# Patient Record
Sex: MALE | Race: WHITE
[De-identification: names, ages, dates, MRNs, and addresses within clinical notes are randomized per-mention and may not be internally consistent; named-entity substitution may affect disease eponyms.]

---

## 2020-09-02 ENCOUNTER — HOSPITAL ENCOUNTER (INPATIENT)
Dept: HOSPITAL 74 - JER | Age: 65
LOS: 1 days | Discharge: HOME | DRG: 313 | End: 2020-09-03
Attending: INTERNAL MEDICINE | Admitting: HOSPITALIST
Payer: COMMERCIAL

## 2020-09-02 VITALS — BODY MASS INDEX: 32.5 KG/M2

## 2020-09-02 DIAGNOSIS — G43.909: ICD-10-CM

## 2020-09-02 DIAGNOSIS — E78.5: ICD-10-CM

## 2020-09-02 DIAGNOSIS — R91.1: ICD-10-CM

## 2020-09-02 DIAGNOSIS — I44.7: ICD-10-CM

## 2020-09-02 DIAGNOSIS — E66.9: ICD-10-CM

## 2020-09-02 DIAGNOSIS — N17.9: ICD-10-CM

## 2020-09-02 DIAGNOSIS — I44.0: ICD-10-CM

## 2020-09-02 DIAGNOSIS — R07.89: Primary | ICD-10-CM

## 2020-09-02 LAB
ALBUMIN SERPL-MCNC: 3.8 G/DL (ref 3.4–5)
ALP SERPL-CCNC: 92 U/L (ref 45–117)
ALT SERPL-CCNC: 34 U/L (ref 13–61)
ANION GAP SERPL CALC-SCNC: 6 MMOL/L (ref 8–16)
APTT BLD: 26.3 SECONDS (ref 25.2–36.5)
AST SERPL-CCNC: 30 U/L (ref 15–37)
BASOPHILS # BLD: 0.6 % (ref 0–2)
BILIRUB SERPL-MCNC: 0.9 MG/DL (ref 0.2–1)
BNP SERPL-MCNC: 78.3 PG/ML (ref 5–125)
BUN SERPL-MCNC: 22.8 MG/DL (ref 7–18)
CALCIUM SERPL-MCNC: 9.6 MG/DL (ref 8.5–10.1)
CHLORIDE SERPL-SCNC: 102 MMOL/L (ref 98–107)
CO2 SERPL-SCNC: 30 MMOL/L (ref 21–32)
CREAT SERPL-MCNC: 1.6 MG/DL (ref 0.55–1.3)
DEPRECATED RDW RBC AUTO: 13.9 % (ref 11.9–15.9)
EOSINOPHIL # BLD: 8.6 % (ref 0–4.5)
GLUCOSE SERPL-MCNC: 110 MG/DL (ref 74–106)
HCT VFR BLD CALC: 46.1 % (ref 35.4–49)
HGB BLD-MCNC: 15.6 GM/DL (ref 11.7–16.9)
INR BLD: 0.95 (ref 0.83–1.09)
LYMPHOCYTES # BLD: 27.1 % (ref 8–40)
MAGNESIUM SERPL-MCNC: 2.3 MG/DL (ref 1.8–2.4)
MCH RBC QN AUTO: 30.4 PG (ref 25.7–33.7)
MCHC RBC AUTO-ENTMCNC: 33.8 G/DL (ref 32–35.9)
MCV RBC: 90 FL (ref 80–96)
MONOCYTES # BLD AUTO: 7.9 % (ref 3.8–10.2)
NEUTROPHILS # BLD: 55.8 % (ref 42.8–82.8)
PLATELET # BLD AUTO: 185 K/MM3 (ref 134–434)
PMV BLD: 8.2 FL (ref 7.5–11.1)
POTASSIUM SERPLBLD-SCNC: 4.8 MMOL/L (ref 3.5–5.1)
PROT SERPL-MCNC: 7.4 G/DL (ref 6.4–8.2)
PT PNL PPP: 11.2 SEC (ref 9.7–13)
RBC # BLD AUTO: 5.12 M/MM3 (ref 4–5.6)
SODIUM SERPL-SCNC: 138 MMOL/L (ref 136–145)
WBC # BLD AUTO: 10.4 K/MM3 (ref 4–10)

## 2020-09-02 PROCEDURE — U0003 INFECTIOUS AGENT DETECTION BY NUCLEIC ACID (DNA OR RNA); SEVERE ACUTE RESPIRATORY SYNDROME CORONAVIRUS 2 (SARS-COV-2) (CORONAVIRUS DISEASE [COVID-19]), AMPLIFIED PROBE TECHNIQUE, MAKING USE OF HIGH THROUGHPUT TECHNOLOGIES AS DESCRIBED BY CMS-2020-01-R: HCPCS

## 2020-09-02 PROCEDURE — A9502 TC99M TETROFOSMIN: HCPCS

## 2020-09-02 NOTE — PDOC
History of Present Illness





- General


Chief Complaint: Chest Pain


Stated Complaint: CHEST TIGHTNESS


Time Seen by Provider: 09/02/20 21:01





- History of Present Illness


Initial Comments: 





09/02/20 21:44


65 M with HLD, obesity presented to the ED for chest pain. Patient had sudden 

non-exertional chest pain this afternoon shortly after eating dinner. Chest pain

located across the chest, felt like belt tying across the chest, pain is 4/10, 

sometimes radiated to the jaws bilaterally. Denies diaphoresis, 

N/V/fever/abdominal pain, SOB, unilat leg swelling. Patient never had chest 

pain. Patient had a brother who had a heart attack at the age of 59, but did 

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 

heart attack. Patient never had kidney stone, gallstone, or shingles. 





PMH: HLD, migraine. 


PSH: tonsil removal, kidney donator. 


Med: propanolol, nortriptant ., lorvastatin 


Allergy: none 


SS: denies smoking, drinking, drugs. 


PCP: rosette 





ROS


GENERAL/CONSTITUTIONAL: No fever or chills. No weakness.


HEAD, EYES, EARS, NOSE AND THROAT: No change in vision. No ear pain or 

discharge. No sore throat.


CARDIOVASCULAR: No chest pain or shortness of breath


RESPIRATORY: No cough, wheezing, or hemoptysis.


GASTROINTESTINAL: No nausea, vomiting, diarrhea or constipation.


GENITOURINARY: No dysuria, frequency, or change in urination.


MUSCULOSKELETAL: No joint or muscle swelling or pain. No neck or back pain.


SKIN: No rash


NEUROLOGIC: No headache, vertigo, loss of consciousness, or change in 

strength/sensation.


ENDOCRINE: No increased thirst. No abnormal weight change


HEMATOLOGIC/LYMPHATIC: No anemia, easy bleeding, or history of blood clots.


ALLERGIC/IMMUNOLOGIC: No hives or skin allergy.





PE


GENERAL: Awake, alert, and fully oriented, in no acute distress


HEAD: No signs of trauma, normocephalic, atraumatic 


EYES: PERRLA, EOMI, sclera anicteric, conjunctiva clear


ENT: Auricles normal inspection, hearing grossly normal, nares patent, 

oropharynx clear without


exudates. Moist mucosa


NECK: Normal ROM, supple, no lymphadenopathy, JVD, or masses


LUNGS: No distress, speaks full sentences, clear to auscultation bilaterally    


HEART: Regular rate and rhythm, normal S1 and S2, no murmurs, rubs or gallops, 

peripheral pulses normal and equal bilaterally. 


ABDOMEN: Soft, nontender, normoactive bowel sounds.  No guarding, no rebound.  

No masses


EXTREMITIES : Normal inspection, Normal range of motion, no edema.  No clubbing 

or cyanosis. 


NEUROLOGICAL: Cranial nerves II through XII grossly intact.  Normal speech, 

normal gait, no focal sensorimotor deficits 


SKIN: Warm, Dry, normal turgor, no rashes or lesions noted











Past History





- Medical History


Allergies/Adverse Reactions: 


                                    Allergies











Allergy/AdvReac Type Severity Reaction Status Date / Time


 


No Known Allergies Allergy   Verified 08/14/12 16:52











Anemia: No


Asthma: No


Cancer: No


Cardiac Disorders: No


CVA: No


COPD: No


CHF: No


Dementia: No


Diabetes: No


GI Disorders: No


 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


HTN: No


Hypercholesterolemia: Yes


Seizures: No


Thyroid Disease: No





- Surgical History


Cardiac Surgery: No


Lung Surgery: No


Neurologic Surgery: No


Orthopedic Surgery: Yes (BACK SURGERY)





- Psycho-Social/Smoking History


Smoking Status: No


Smoking History: Never smoked


Number of Cigarettes Smoked Daily: 0





- Substance Abuse Hx (Audit-C & DAST Scrn)


How often the patient has a drink containing alcohol: Monthly or less


Score: In Men: 4 or > Positive; In Women: 3 or > Positive: 1


Screen Result (Pos requires Nsg. Audit-10AR): Negative


In the last yr the pt used illegal drug/Rx for NonMed reason: No


Score:  Yes response is considered Positive: 0


Screen Result (Positive result requires Nsg. DAST-10): Negative





*Physical Exam





- Vital Signs


                                Last Vital Signs











Temp Pulse Resp BP Pulse Ox


 


 98.4 F   79   19   147/85   97 


 


 09/02/20 21:00  09/02/20 21:00  09/02/20 21:00  09/02/20 21:00  09/02/20 21:00














ED Treatment Course





- LABORATORY


CBC & Chemistry Diagram: 


                                 09/02/20 21:15





                                 09/02/20 21:15





Discharge





- Discharge Information


Problems reviewed: Yes


Clinical Impression/Diagnosis: 


 Chest pain





Condition: Improved





- Admission


Yes





- Follow up/Referral


Referrals: 


Silvino Jones MD [Primary Care Provider] - 





- Patient Discharge Instructions





- Post Discharge Activity

## 2020-09-02 NOTE — PDOC
Rapid Medical Evaluation


Time Seen by Provider: 09/02/20 21:01


Medical Evaluation: 


                                    Allergies











Allergy/AdvReac Type Severity Reaction Status Date / Time


 


No Known Allergies Allergy   Verified 08/14/12 16:52











09/02/20 21:02


CC: feeling of strap around his chest since this afternoon and subsided 

slightly. no other complaints. Took baby ASA this am


Exam: vss, No reproducible CP, 


Plan: cardiac w/u





**Discharge Disposition





- Diagnosis


 Chest pain








- Referrals





- Patient Instructions





- Post Discharge Activity

## 2020-09-02 NOTE — PDOC
Attending Attestation





- Resident


Resident Name: Rojelio Daly





- ED Attending Attestation


I have performed the following: I have examined & evaluated the patient, The 

case was reviewed & discussed with the resident, I agree w/resident's findings &

plan





- HPI


HPI: 





09/02/20 22:48


see resident hpi





- Physicial Exam


PE: 





09/02/20 22:48


see resident exam





- Medical Decision Making





09/02/20 22:48


65-year-old male with an episode of anterior chest squeezing that began after 

eating


EKG on arrival shows a sinus rhythm at 81 bpm with a left bundle branch block


Unfortunately no old is available for comparison


Patient is improved after aspirin and nitroglycerin as well as Tylenol IV


Troponin within normal limits at this time


We will admit to telemetry observation for further evaluation





Discharge





- Discharge Information


Problems reviewed: Yes


Clinical Impression/Diagnosis: 


 Chest pain








- Follow up/Referral


Referrals: 


Sivlino Jones MD [Primary Care Provider] - 





- Patient Discharge Instructions





- Post Discharge Activity

## 2020-09-03 VITALS — SYSTOLIC BLOOD PRESSURE: 145 MMHG | HEART RATE: 80 BPM | TEMPERATURE: 97.7 F | DIASTOLIC BLOOD PRESSURE: 74 MMHG

## 2020-09-03 LAB
ANION GAP SERPL CALC-SCNC: 7 MMOL/L (ref 8–16)
BASOPHILS # BLD: 0.8 % (ref 0–2)
BUN SERPL-MCNC: 21.9 MG/DL (ref 7–18)
CALCIUM SERPL-MCNC: 8.9 MG/DL (ref 8.5–10.1)
CHLORIDE SERPL-SCNC: 105 MMOL/L (ref 98–107)
CHOLEST SERPL-MCNC: 161 MG/DL (ref 50–200)
CO2 SERPL-SCNC: 27 MMOL/L (ref 21–32)
CREAT SERPL-MCNC: 1.5 MG/DL (ref 0.55–1.3)
DEPRECATED RDW RBC AUTO: 13.6 % (ref 11.9–15.9)
EOSINOPHIL # BLD: 9.1 % (ref 0–4.5)
GLUCOSE SERPL-MCNC: 95 MG/DL (ref 74–106)
HCT VFR BLD CALC: 44.5 % (ref 35.4–49)
HDLC SERPL-MCNC: 40 MG/DL (ref 40–60)
HGB BLD-MCNC: 15 GM/DL (ref 11.7–16.9)
LDLC SERPL CALC-MCNC: 94 MG/DL (ref 5–100)
LYMPHOCYTES # BLD: 27.1 % (ref 8–40)
MAGNESIUM SERPL-MCNC: 2.4 MG/DL (ref 1.8–2.4)
MCH RBC QN AUTO: 30.3 PG (ref 25.7–33.7)
MCHC RBC AUTO-ENTMCNC: 33.6 G/DL (ref 32–35.9)
MCV RBC: 90.2 FL (ref 80–96)
MONOCYTES # BLD AUTO: 8.7 % (ref 3.8–10.2)
NEUTROPHILS # BLD: 54.3 % (ref 42.8–82.8)
PHOSPHATE SERPL-MCNC: 3.6 MG/DL (ref 2.5–4.9)
PLATELET # BLD AUTO: 173 K/MM3 (ref 134–434)
PMV BLD: 8.1 FL (ref 7.5–11.1)
POTASSIUM SERPLBLD-SCNC: 4.3 MMOL/L (ref 3.5–5.1)
RBC # BLD AUTO: 4.94 M/MM3 (ref 4–5.6)
SODIUM SERPL-SCNC: 140 MMOL/L (ref 136–145)
TRIGL SERPL-MCNC: 208 MG/DL (ref 0–150)
WBC # BLD AUTO: 8.9 K/MM3 (ref 4–10)

## 2020-09-03 RX ADMIN — HEPARIN SODIUM SCH UNIT: 5000 INJECTION, SOLUTION INTRAVENOUS; SUBCUTANEOUS at 14:33

## 2020-09-03 RX ADMIN — HEPARIN SODIUM SCH UNIT: 5000 INJECTION, SOLUTION INTRAVENOUS; SUBCUTANEOUS at 06:11

## 2020-09-03 NOTE — ECHO
______________________________________________________________________________



Name: LUIS FERNANDO HARTLEY                                     Exam:Adult Echocardiogram

MRN: [id number]         Study Date: 2020 11:20 AM

Age: 65 yrs

______________________________________________________________________________



Height: 74 in        Weight: 240 lb        BSA: 2.3 m2



______________________________________________________________________________



MMode/2D Measurements & Calculations

IVSd: 0.85 cm                                          Ao root diam: 3.1 cm

LVIDd: 4.4 cm                                          LA dimension: 2.3 cm

LVIDs: 3.4 cm                                          ACS: 2.0 cm

LVPWd: 0.88 cm



________________________________________________________

EDV(Teich): 88.1 ml                                    LVOT diam: 2.0 cm

ESV(Teich): 48.4 ml



________________________________________________________

LVLd ap4: 7.2 cm                                       SV(MOD-sp4): 39.0 ml

EDV(MOD-sp4): 84.0 ml

LVLs ap4: 6.0 cm

ESV(MOD-sp4): 45.0 ml

________________________________________________________



LAV (MOD-bp): 25.0 ml                                  TAPSE: 2.5 cm

                                                       RV S Star: 10.2 cm/sec



Doppler Measurements & Calculations

Ao V2 max: 100.4 cm/sec                                  LV V1 max P.6 mmHg

Ao max P.0 mmHg                                      LV V1 mean P.94 mmHg

Ao V2 mean: 66.4 cm/sec                                  LV V1 max: 63.7 cm/sec

Ao mean P.0 mmHg                                     LV V1 mean: 46.1 cm/sec

Ao V2 VTI: 16.8 cm                                       LV V1 VTI: 11.5 cm

CHUY(I,D): 2.1 cm2



CHUY(V,D): 1.9 cm2

__________________________________________________________



SV(LVOT): 35.1 ml                                        TR max star: 219.9 cm/sec

                                                         TR max P.7 mmHg

__________________________________________________________



PA V2 max: 92.3 cm/sec                                   PI end-d star: 99.1 cm/sec

PA max PG: 3.4 mmHg

PA acc slope: 497.4 cm/sec2

PA acc time: 0.11 sec



__________________________________________________________

Med Peak E' Star: 5.9 cm/sec                              PA pr(Accel): 29.9 mmHg

Lat Peak E' Star: 9.8 cm/sec





______________________________________________________________________________



Tech Comments

TDS due to orientation of patient's heart.

Procedure

A complete two-dimensional transthoracic echocardiogram was performed (2D, M-mode, Doppler and color 
flow

Doppler).

Left Ventricle

The left ventricle is normal in size. Left ventricular systolic function is low normal. Ejection Frac
tion =

50%. Septal motion is consistent with conduction abnormality.

Right Ventricle

The right ventricle is normal in size and function.

Atria

Normal left and right atrial size and function.

Mitral Valve

There is no mitral regurgitation noted.

Tricuspid Valve

There is trace tricuspid regurgitation. There was insufficient TR detected to calculate RV systolic p
ressure.

Aortic Valve

No hemodynamically significant valvular aortic stenosis. No aortic regurgitation is present.

Pulmonic Valve

There is no pulmonic valvular regurgitation.

Great Vessels

The aortic root is normal size.

Pericardium/Pleura

There is no pericardial effusion.



______________________________________________________________________________



Interpretation Summary

Left ventricular systolic function is low normal.

Septal motion is consistent with conduction abnormality.

The right ventricle is normal in size and function.

There is trace tricuspid regurgitation.





MD Amadou Piper 2020 01:44 PM

## 2020-09-03 NOTE — EKG
Test Reason : 

Blood Pressure : ***/*** mmHG

Vent. Rate : 079 BPM     Atrial Rate : 079 BPM

   P-R Int : 224 ms          QRS Dur : 150 ms

    QT Int : 418 ms       P-R-T Axes : 058 014 098 degrees

   QTc Int : 479 ms

 

SINUS RHYTHM WITH 1ST DEGREE A-V BLOCK

LEFT BUNDLE BRANCH BLOCK

ABNORMAL ECG

WHEN COMPARED WITH ECG OF 02-SEP-2020 21:22,

NO SIGNIFICANT CHANGE WAS FOUND

Confirmed by BRAXTON MURPHY, RUSS ([id number]) on 9/3/2020 3:28:27 PM

 

Referred By:             Confirmed By:RUSS LIMON MD

## 2020-09-03 NOTE — DS
Physical Exam: 


SUBJECTIVE: Patient seen and examined at bedside. No acute events reported 

overnight. This morning, patient reports resolution of chest pain symptoms. 








OBJECTIVE:





                                   Vital Signs











 Period  Temp  Pulse  Resp  BP Sys/Oconnell  Pulse Ox


 


 Last 24 Hr  98 F-98.4 F  67-81  15-19  132-147/67-85  








PHYSICAL EXAM


GENERAL: AAOx3, in no acute distress


HEENT: NCAT, PERRLA, EOMI, sclera anicteric, conjunctiva clear, oropharynx clear

w/o exudates. MMM.


NECK: Normal ROM, supple, no lymphadenopathy, JVD, or masses


LUNGS: CTABL no wheezes/ rhonchi/ rales. No distress, speaks in full sentences. 

No increased work of breathing.


HEART: RRR, normal S1 S2, no M/R/G, peripheral pulses 2+ and equal b/l


ABDOMEN: Soft, NTND, + BS. No guarding or rebound. No hepatomegaly or 

splenomegaly.


MSK: ROM WNL


EXTREMITIES: Normal inspection. No peripheral edema. No clubbing or cyanosis.


NEUROLOGICAL: CN II-XII intact. Normal speech, normal gait, no focal 

sensorimotor deficits.


SKIN: Warm, Dry, normal turgor, no rashes or lesions noted





LABS


                         Laboratory Results - last 24 hr


                                    CBC, BMP





                                 09/03/20 07:05 





                                 09/03/20 07:05 

















  09/02/20 09/02/20 09/02/20





  21:15 21:15 21:15


 


WBC   10.4 H 


 


RBC   5.12 


 


Hgb   15.6 


 


Hct   46.1 


 


MCV   90.0 


 


MCH   30.4 


 


MCHC   33.8 


 


RDW   13.9 


 


Plt Count   185 


 


MPV   8.2 


 


Absolute Neuts (auto)   5.8 


 


Neutrophils %   55.8 


 


Lymphocytes %   27.1 


 


Monocytes %   7.9 


 


Eosinophils %   8.6 H 


 


Basophils %   0.6 


 


Nucleated RBC %   0 


 


PT with INR  11.20  


 


INR  0.95  


 


PTT (Actin FS)  26.3  


 


Sodium    138


 


Potassium    4.8


 


Chloride    102


 


Carbon Dioxide    30


 


Anion Gap    6 L


 


BUN    22.8 H


 


Creatinine    1.6 H


 


Est GFR (CKD-EPI)AfAm    52.36


 


Est GFR (CKD-EPI)NonAf    45.18


 


Random Glucose    110 H


 


Hemoglobin A1c %   


 


Calcium    9.6


 


Phosphorus   


 


Magnesium    2.3


 


Total Bilirubin    0.9


 


AST    30


 


ALT    34


 


Alkaline Phosphatase    92


 


Creatine Kinase    101


 


Troponin I    < 0.02


 


B-Natriuretic Peptide    78.3


 


Total Protein    7.4


 


Albumin    3.8


 


Triglycerides   


 


Cholesterol   


 


Total LDL Cholesterol   


 


HDL Cholesterol   


 


TSH   














  09/03/20 09/03/20 09/03/20





  07:05 07:05 07:05


 


WBC  8.9  


 


RBC  4.94  


 


Hgb  15.0  


 


Hct  44.5  


 


MCV  90.2  


 


MCH  30.3  


 


MCHC  33.6  


 


RDW  13.6  


 


Plt Count  173  


 


MPV  8.1  


 


Absolute Neuts (auto)  4.8  


 


Neutrophils %  54.3  


 


Lymphocytes %  27.1  


 


Monocytes %  8.7  


 


Eosinophils %  9.1 H  


 


Basophils %  0.8  


 


Nucleated RBC %  0  


 


PT with INR   


 


INR   


 


PTT (Actin FS)   


 


Sodium   140 


 


Potassium   4.3 


 


Chloride   105 


 


Carbon Dioxide   27 


 


Anion Gap   7 L 


 


BUN   21.9 H 


 


Creatinine   1.5 H 


 


Est GFR (CKD-EPI)AfAm   56.61 


 


Est GFR (CKD-EPI)NonAf   48.84 


 


Random Glucose   95 


 


Hemoglobin A1c %    5.5


 


Calcium   8.9 


 


Phosphorus   3.6 


 


Magnesium   2.4 


 


Total Bilirubin   


 


AST   


 


ALT   


 


Alkaline Phosphatase   


 


Creatine Kinase   


 


Troponin I   


 


B-Natriuretic Peptide   


 


Total Protein   


 


Albumin   


 


Triglycerides   208 H 


 


Cholesterol   161 


 


Total LDL Cholesterol   94 


 


HDL Cholesterol   40 


 


TSH   3.92 H 














  09/03/20





  07:05


 


WBC 


 


RBC 


 


Hgb 


 


Hct 


 


MCV 


 


MCH 


 


MCHC 


 


RDW 


 


Plt Count 


 


MPV 


 


Absolute Neuts (auto) 


 


Neutrophils % 


 


Lymphocytes % 


 


Monocytes % 


 


Eosinophils % 


 


Basophils % 


 


Nucleated RBC % 


 


PT with INR 


 


INR 


 


PTT (Actin FS) 


 


Sodium 


 


Potassium 


 


Chloride 


 


Carbon Dioxide 


 


Anion Gap 


 


BUN 


 


Creatinine 


 


Est GFR (CKD-EPI)AfAm 


 


Est GFR (CKD-EPI)NonAf 


 


Random Glucose 


 


Hemoglobin A1c % 


 


Calcium 


 


Phosphorus 


 


Magnesium 


 


Total Bilirubin 


 


AST 


 


ALT 


 


Alkaline Phosphatase 


 


Creatine Kinase 


 


Troponin I  < 0.02


 


B-Natriuretic Peptide 


 


Total Protein 


 


Albumin 


 


Triglycerides 


 


Cholesterol 


 


Total LDL Cholesterol 


 


HDL Cholesterol 


 


TSH 











HOSPITAL COURSE:


65 year old male patient with past medical history of HLD, Obestiy, and 

Migraine, who presented to the emergency room with a band-like chest pressure 

radiating to the jaws.  ECG showed left bundle branch block.  Chest pressure was

alleviated with nitro. Possibly new-onset left bundle branch block on ECG.  No 

old ECG could be found to compare. Patient was admitted to telemetry with 

cardiology consult. Cardiac workup including repeat EKG, echocardiogram, stress 

test was negative for acute cardiac events. Troponins were negative throughout 

the patient's stay. Patient had an elevated creatinine at admission which was 

treated with gentle IV hydration. Incidental findings upon imaging was a 

hyperdense lung nodule in the right mid lung 1.7 x 1.1 cm. Patient was told to 

follow up with his PCP about the nodule. Patient was discharged after multiple 

cardiac imaging was negative for an acute cardiac process with outpatient 

cardiology followup within 1 week. 





Date of Admission:09/02/20 9/2/2020- portable CXR- Impression:  Hyperdense nodule in the right midlung 

measuring 1.7 x 1.1 cm suggestive of a calcified nodule. Correlation with a 

nonemergent CT scan of the chest would be helpful for further evaluation and w

ould serve as a baseline for future follow-up. 





9/3/2020- sinus rhythm with 1st degree AV block. LBBB, abnormal EKG, 





9/3/2020- Left ventricular systolic function is low normal, septal motion is con

sistent with conduction abnormality, right ventricle is normal in size and 

function. There is trace tricuspid regurgitation 





9/3/2020- G RISHABH CONNIE 1 DAY- EXERCISE RESULTS: Nondiagnostic stress ecg due to 

baseline LBBB. NUCLEAR RESULTS: No ischemia. LVEF 52%. 





Date of Discharge: 09/03/20











Minutes to complete discharge: 36





Discharge Summary


Problems reviewed: Yes


Reason For Visit: CHEST PAIN


Current Active Problems





Chest pain (Acute)








Condition: Stable





- Instructions


Diet, Activity, Other Instructions: 


Your visit:


You were admitted to the hospital for chest pain. We found no acute 

abnormalities of your heart. You were treated with fluids and medications with 

improvement of your symptoms.


-During your visit, we did a x-ray of your chest which showed a 1.7 x 1.1 cm 

nodule in your right lung. Please follow up with your Primary Care Provider 

about this. 





Cardiac Stress Test Results: 


Exercise Hemodynamics: 


Resting heart rate was 93 BPM, peak Lexiscan infusion heart rate was 108 BPM. 

representing- of the maximum predicted heart rate. Resting BP was 122/78 mm Hg. 

Peak Lexiscan infusion BP was 146/86 mm/Hg.





Electrocardiographic findings: baseline sinus rhythm with left bundle branch 

block. Nondiagnostic stress ecg due to baseline left bundle branch block.





Gated Perfusion scan and Spect images: small size, mild intensity fixed defects 

of inferiorlateral and anteroseptal walls with normal wall motion, c/w artifact,

no ischemia. no TID 





-Exercise Results: Nondiagnostic stress ecg due to baseline left bundle branch 

block


-Nuclear Results: Left ventricular ejection fraction: 52% 





Medications changes:


-Continue to take all your home medications as prescribed. 





Follow up:


- Please follow-up with your Cardiologist, Dr. Piper in 1 week to go over the 

imaging of your heart that we did on you. 


- Visit with your Primary Care Provider, Dr. Jones in 1 weeks to get blood work 

done to asses your kidney function. 





Additional Instructions:


-You are being discharged to your home.


-Please return to the Emergency Department if you experience worsening pain, 

fevers, chills, shortness of breath, or chest pain, or if you experience any 

worsening, new or concerning symptoms. 


Referrals: 


Amadou Piper MD [Staff Physician] - 


Silvino Jones MD [Primary Care Provider] - 


Disposition: HOME





- Home Medications


Comprehensive Discharge Medication List: 


Ambulatory Orders





Ciclopirox Olamine [Ciclopirox] 30 gm TP BID 09/03/20 


Lovastatin 40 mg PO DAILY 09/03/20 


Nortriptyline HCl [Pamelor -] 50 mg PO HS 09/03/20 


propRANOLol HCL [Propranolol HCl ER] 120 mg PO HS 09/03/20 








This patient is new to me today: Yes


Date on this admission: 09/03/20


Emergency Visit: Yes


ED Registration Date: 09/02/20


Care time: The patient presented to the Emergency Department on the above date 

and was hospitalized for further evaluation of their emergent condition.


Critical Care patient: No





- Discharge Referral


Referred to [facility] No





ATTENDING PHYSICIAN STATEMENT





I saw and evaluated the patient.


I reviewed the resident's note and discussed the case with the resident.


I agree with the resident's findings and plan as documented.








SUBJECTIVE:








OBJECTIVE:








ASSESSMENT AND PLAN:

## 2020-09-03 NOTE — EKG
Test Reason : 

Blood Pressure : ***/*** mmHG

Vent. Rate : 081 BPM     Atrial Rate : 081 BPM

   P-R Int : 252 ms          QRS Dur : 156 ms

    QT Int : 420 ms       P-R-T Axes : 055 029 096 degrees

   QTc Int : 487 ms

 

SINUS RHYTHM WITH 1ST DEGREE A-V BLOCK

LEFT BUNDLE BRANCH BLOCK

ABNORMAL ECG

NO PREVIOUS ECGS AVAILABLE

Confirmed by RUSS LIMON MD ([id number]) on 9/3/2020 10:10:21 AM

 

Referred By:             Confirmed By:RUSS LIMON MD

## 2020-09-03 NOTE — PN
Teaching Attending Note


Name of Resident: Jah Spangler





ATTENDING PHYSICIAN STATEMENT





I saw and evaluated the patient.


I reviewed the resident's note and discussed the case with the resident.


I agree with the resident's findings and plan as documented.








SUBJECTIVE:


patient feels well, has no complaints 








OBJECTIVE:


                                   Vital Signs











 Period  Temp  Pulse  Resp  BP Sys/Oconnell  Pulse Ox


 


 Last 24 Hr  98 F-98.4 F  67-79  15-19  132-147/67-85  














GENERAL: Awake, alert, and fully oriented, in no acute distress.


HEAD: Normal with no signs of trauma.


EYES: Pupils equal, round and reactive to light, extraocular movements intact, 

sclera anicteric, conjunctiva clear. No lid lag.


EARS, NOSE, THROAT: Ears normal, nares patent, oropharynx clear without 

exudates. Moist mucous membranes.


NECK: Normal range of motion, supple without lymphadenopathy, JVD, or masses.


LUNGS: Breath sounds equal, clear to auscultation bilaterally. No wheezes, and 

no crackles. No accessory muscle use.


HEART: Regular rate and rhythm, normal S1 and S2 without murmur, rub or gallop.


ABDOMEN: Soft, nontender, not distended, normoactive bowel sounds, no guarding, 

no rebound, no masses.  No hepatomegaly or splenomegaly. obese 


MUSCULOSKELETAL: Normal range of motion at all joints. No bony deformities or 

tenderness. No CVA tenderness.


UPPER EXTREMITIES: 2+ pulses, warm, well-perfused. No cyanosis. No clubbing. Cap

refill <2 seconds. No peripheral edema.


LOWER EXTREMITIES: 2+ pulses, warm, well-perfused. No calf tenderness. No 

peripheral edema. 


NEUROLOGICAL:  Cranial nerves II-XII intact. Normal speech. Normal gait.


PSYCHIATRIC: Cooperative. Good eye contact. Appropriate mood and affect.


SKIN: Warm, dry, normal turgor, no rashes or lesions noted. 











ASSESSMENT AND PLAN:


66 y/o M with Hx of HLD, Obesity who presents with chest pain 





Chest Pain: 


ACS ruled out


EKG with LBBB morphology 


Check Stress test 


Check Echo 


Check Lipid Panel, A1c, TSH 


Cont ASA, Statin 


PRN SL Nitro 








Lung Nodule: 


Will need outpatient follow up with CT scan 








Rest of plan as per resident note

## 2020-09-03 NOTE — HP
CHIEF COMPLAINT: "band-like chest pressure"





PCP: Dr. Jones





HISTORY OF PRESENT ILLNESS:


65 year old male patient with past medical history of HLD, Obestiy, and 

Migraine, who presented to the emergency room with a band-like chest pressure 

that began in the afternoon after lunch, where he ate a hotdog.  The patient 

denies any precipitating events.  The patient denies any past occurrences of his

symptoms.  The patient did not think much of his chest pressure until dinner, 

when he felt a little tired.  He called his brother, who had an MI at age 59, 

and asked whether his chest pressure could be a heart attack, and his brother 

advised him to go to the emergency room.  The chest pressure is constant, 

radiates to the jaws, and was 5/10 in intensity when the patient came to the ED.

 Nothing makes the pressure worse.  The patient tried to take Tums and and 

bicarb, neither of which made the pressure better.  The patient received nitro 

in the ED, which did succeed in alleviating the pressure; however, the patient 

still has mild pressure centered around his left armpit area.





The patient reports taking a low dose propranolol and nortriptyline for migraine

prophylaxis.  He takes them every day and is compliant with taking his 

medications.  He has not had any migraines in the recent past.





The patient was sent last year to the cardiologist Dr. Krissy Lcay by his PCP 

because of a "lab abnormality".  Dr. Lacy performed tests and told the patient

that he is healthy.  The patient does not recall any stress test in the recent 

past.  The patient is not aware of any ECG abnormality such as a left bundle 

branch block in the past.  He had an ECHO in 11/2019 which showed impaired left 

ventricular relaxation with an EF of 50%.





ER course was notable for:


(1) ECG (NSR 81bpm left bundle branch block and 1st degree AV block [VT interval

 252ms] QTc 487ms).  No old ECG was found to compare.


(2) CXR (hyperdense nodule in right midlung 1.7x1.1cm suggestive of a calcified 

nodule)


(3) Troponin negative, BNP negative


(4) Nitro, ASA, and Tylenol





Recent Travel: Denies





PAST MEDICAL HISTORY:


HLD, Obesity, Migraine





PAST SURGICAL HISTORY:


Tonsillectomy, Kidney donor, Back surgery





Social History:


Smoking: Denies


Alcohol: Denies (one 12 pack of beer per year)


Drugs: Denies


Occupation:  for [employer]


Exercise: Walks 2 dogs.  Rides a bike, but hasn't been riding his bike in 2 

months due to COVID.


Diet: "Balanced diet" that includes fruits and vegetables.  Was on the keto diet

before COVID started and lost weight, but ever since COVID has been eating more 

cookies, so his weight has increased from 225 to 240lbs.





Allergies





No Known Allergies Allergy (Verified 08/14/12 16:52)


   








HOME MEDICATIONS:


REVIEW OF SYSTEMS


CONSTITUTIONAL: mild fatigue


Absent: no fever, no chills, no body aches


HEENT: acid taste in mouth


Absent: 


CARDIOVASCULAR: band-like chest pressure


Absent: no acid reflux, no palpitations


RESPIRATORY: 


Absent: no shortness of breath


GASTROINTESTINAL:


Absent: no nausea, no vomiting, no diarrhea, no constipation


GENITOURINARY: 


Absent: no dysuria, no foamy urine


NEUROLOGIC: 


Absent: no headache











PHYSICAL EXAMINATION


                               Vital Signs - 24 hr











  09/02/20 09/03/20





  21:00 00:17


 


Temperature 98.4 F 


 


Pulse Rate 79 


 


Pulse Rate [  78





Apical]  


 


Respiratory 19 16





Rate  


 


Blood Pressure 147/85 


 


Blood Pressure  138/78





[Left Arm]  


 


O2 Sat by Pulse 97 98





Oximetry (%)  











GENERAL: Awake, alert, and fully oriented, in no acute distress.


HEAD: Normal with no signs of trauma.


EYES: Pupils equal, round and reactive to light, extraocular movements intact. 

No lid lag.


EARS, NOSE, THROAT: Ears normal, nares patent, oropharynx clear without 

exudates. Moist mucous membranes.


NECK: Normal range of motion, supple without lymphadenopathy, JVD, or masses.


LUNGS: Breath sounds equal, clear to auscultation bilaterally. No wheezes, and 

no crackles. No accessory muscle use.


HEART: Regular rate and rhythm, normal S1 and S2 without murmur, rub or gallop.


ABDOMEN: Soft, nontender, not distended, normoactive bowel sounds, no guarding, 

no rebound, no masses.


MUSCULOSKELETAL: Normal range of motion at all joints. No bony deformities or 

tenderness. No reproducible symptoms on palpation of chest wall.


UPPER EXTREMITIES: 2+ pulses, warm, well-perfused. No cyanosis. No clubbing. No 

peripheral edema.


LOWER EXTREMITIES: 2+ pulses, warm, well-perfused. No calf tenderness. 1+ 

pitting edema bilaterally.


NEUROLOGICAL:  Normal speech.


PSYCHIATRIC: Cooperative. Good eye contact. Appropriate mood and affect.


SKIN: Warm, dry, normal turgor, no rashes or lesions noted, normal capillary 

refill. 


                         Laboratory Results - last 24 hr











  09/02/20 09/02/20 09/02/20





  21:15 21:15 21:15


 


WBC   10.4 H 


 


RBC   5.12 


 


Hgb   15.6 


 


Hct   46.1 


 


MCV   90.0 


 


MCH   30.4 


 


MCHC   33.8 


 


RDW   13.9 


 


Plt Count   185 


 


MPV   8.2 


 


Absolute Neuts (auto)   5.8 


 


Neutrophils %   55.8 


 


Lymphocytes %   27.1 


 


Monocytes %   7.9 


 


Eosinophils %   8.6 H 


 


Basophils %   0.6 


 


Nucleated RBC %   0 


 


PT with INR  11.20  


 


INR  0.95  


 


PTT (Actin FS)  26.3  


 


Sodium    138


 


Potassium    4.8


 


Chloride    102


 


Carbon Dioxide    30


 


Anion Gap    6 L


 


BUN    22.8 H


 


Creatinine    1.6 H


 


Est GFR (CKD-EPI)AfAm    52.36


 


Est GFR (CKD-EPI)NonAf    45.18


 


Random Glucose    110 H


 


Calcium    9.6


 


Magnesium    2.3


 


Total Bilirubin    0.9


 


AST    30


 


ALT    34


 


Alkaline Phosphatase    92


 


Creatine Kinase    101


 


Troponin I    < 0.02


 


B-Natriuretic Peptide    78.3


 


Total Protein    7.4


 


Albumin    3.8











ASSESSMENT/PLAN:


65 year old male patient with past medical history of HLD, Obestiy, and 

Migraine, who presented to the emergency room with a band-like chest pressure 

radiating to the jaws.  ECG showed left bundle branch block.  Chest pressure was

alleviated with nitro.





1. Possible unstable angina


- Band-like chest pressure radiating to the jaws


- Possibly new-onset left bundle branch block on ECG.  No old ECG could be found

to compare.


> Consulted Cardio


> Tele


> Repeat Trop


> Repeat ECG


> Lipid Panel and Cholesterol


> A1C


> Daily ASA


> Possible outpatient stress test depending on Cardio's recommendations





2. MARGOTH


- Creatinine of 1.6


> Gentle IV Fluid Hydration


> Monitoring Creatinine





3. Lung Nodule


- Hyperdense nodule in right midlung 1.7x1.1cm suggestive of a calcified nodule


> Outpatient f/u with Pulmonology





4. HLD


> Atorvastatin





Medications were reconcilliated with the pharmacy at 4:00am 9/3/2020.





#FEN - Normal Saline @ 43ml/hr.  Monitor Electrolytes





DVT PPx - Heparin SQ








Family Medical History


Family History: As Documented


Family Hx Cardiac Disorders: Brother (MI at age 59)





Visit type





- Emergency Visit


Emergency Visit: Yes


ED Registration Date: 09/02/20


Care time: The patient presented to the Emergency Department on the above date 

and was hospitalized for further evaluation of their emergent condition.





- New Patient


This patient is new to me today: Yes


Date on this admission: 09/03/20





- Critical Care


Critical Care patient: No





ATTENDING PHYSICIAN STATEMENT





I saw and evaluated the patient.


I reviewed the resident's note and discussed the case with the resident.


I agree with the resident's findings and plan as documented.








SUBJECTIVE:








OBJECTIVE:








ASSESSMENT AND PLAN:

## 2020-09-03 NOTE — CON.CARD
Cardiology Consult (text)





- Consultation


Consultation Note: 





cc:  cp





hpi:  65 m hx htn, hld, here with cp.  Yesterday was relaxing and noticed cp.  

Felt like pressure across front of chest.  No radiation, no other sxs.  No sob 

palps dizzy loc pnd orthopnea le edema.  CP resolved now. Was doing some lifting

last week and thought maybe he had sore muscle.





pmh: per hpi


psh: back surgery


social: no tob


fam: brother with cad, mi 59


ros: per hpi; all others nl


meds:


                                Home Medications











 Medication  Instructions  Recorded


 


Lovastatin 40 mg PO DAILY 09/03/20


 


Nortriptyline HCl [Pamelor -] 50 mg PO HS 09/03/20


 


propRANOLol HCL [Propranolol HCl 120 mg PO HS 09/03/20





ER]  








                                   Vital Signs











 Period  Temp  Pulse  Resp  BP Sys/Oconnell  Pulse Ox


 


 Last 24 Hr  98 F-98.4 F  67-79  15-19  132-147/67-85  








nad no jvd


rrr s1s2 no mrg


cta bl nl eff aao3


no le e/c/c


abd nt nd pos bs


no jaundice diaphroesis


pos dp pt no carotid bruits


aao3





                             Laboratory Last Values











WBC  8.9 K/mm3 (4.0-10.0)   09/03/20  07:05    


 


RBC  4.94 M/mm3 (4.00-5.60)   09/03/20  07:05    


 


Hgb  15.0 GM/dL (11.7-16.9)   09/03/20  07:05    


 


Hct  44.5 % (35.4-49)   09/03/20  07:05    


 


MCV  90.2 fl (80-96)   09/03/20  07:05    


 


MCH  30.3 pg (25.7-33.7)   09/03/20  07:05    


 


MCHC  33.6 g/dl (32.0-35.9)   09/03/20  07:05    


 


RDW  13.6 % (11.9-15.9)   09/03/20  07:05    


 


Plt Count  173 K/MM3 (134-434)   09/03/20  07:05    


 


MPV  8.1 fl (7.5-11.1)   09/03/20  07:05    


 


Absolute Neuts (auto)  4.8 K/mm3 (1.5-8.0)   09/03/20  07:05    


 


Neutrophils %  54.3 % (42.8-82.8)   09/03/20  07:05    


 


Lymphocytes %  27.1 % (8-40)   09/03/20  07:05    


 


Monocytes %  8.7 % (3.8-10.2)   09/03/20  07:05    


 


Eosinophils %  9.1 % (0-4.5)  H  09/03/20  07:05    


 


Basophils %  0.8 % (0-2.0)   09/03/20  07:05    


 


Nucleated RBC %  0 % (0-0)   09/03/20  07:05    


 


PT with INR  11.20 SEC (9.7-13.0)   09/02/20  21:15    


 


INR  0.95  (0.83-1.09)   09/02/20  21:15    


 


PTT (Actin FS)  26.3 SECONDS (25.2-36.5)   09/02/20  21:15    


 


Sodium  140 mmol/L (136-145)   09/03/20  07:05    


 


Potassium  4.3 mmol/L (3.5-5.1)   09/03/20  07:05    


 


Chloride  105 mmol/L ()   09/03/20  07:05    


 


Carbon Dioxide  27 mmol/L (21-32)   09/03/20  07:05    


 


Anion Gap  7 MMOL/L (8-16)  L  09/03/20  07:05    


 


BUN  21.9 mg/dL (7-18)  H  09/03/20  07:05    


 


Creatinine  1.5 mg/dL (0.55-1.3)  H  09/03/20  07:05    


 


Est GFR (CKD-EPI)AfAm  56.61   09/03/20  07:05    


 


Est GFR (CKD-EPI)NonAf  48.84   09/03/20  07:05    


 


Random Glucose  95 mg/dL ()   09/03/20  07:05    


 


Hemoglobin A1c %  5.5 % (4.2-6.3)   09/03/20  07:05    


 


Calcium  8.9 mg/dL (8.5-10.1)   09/03/20  07:05    


 


Phosphorus  3.6 mg/dL (2.5-4.9)   09/03/20  07:05    


 


Magnesium  2.4 mg/dL (1.8-2.4)   09/03/20  07:05    


 


Total Bilirubin  0.9 mg/dL (0.2-1)   09/02/20  21:15    


 


AST  30 U/L (15-37)   09/02/20  21:15    


 


ALT  34 U/L (13-61)   09/02/20  21:15    


 


Alkaline Phosphatase  92 U/L ()   09/02/20  21:15    


 


Creatine Kinase  101 U/L ()   09/02/20  21:15    


 


Troponin I  < 0.02 ng/ml (0.00-0.05)   09/03/20  07:05    


 


B-Natriuretic Peptide  78.3 pg/ml (5-125)   09/02/20  21:15    


 


Total Protein  7.4 g/dl (6.4-8.2)   09/02/20  21:15    


 


Albumin  3.8 g/dl (3.4-5.0)   09/02/20  21:15    


 


Triglycerides  208 mg/dL (0-150)  H  09/03/20  07:05    


 


Cholesterol  161 mg/dL ()   09/03/20  07:05    


 


Total LDL Cholesterol  94 mg/dL (5-100)   09/03/20  07:05    


 


HDL Cholesterol  40 mg/dL (40-60)   09/03/20  07:05    








cxr:   no chf





echo 11/2019: low nl lvef, nl rv, mild mr, mild tr, nl rvsp





ecg: sr, old lbbb








a/p:  65 m hx htn, hld, here with cp.





cp:


-resolved now


-trops neg, no signs acs


-ecg with old lbbb


-recent echo unremarkable


-given risk factors and fam hx will check nuclear stress test, if benign then ok

for dc from cardiac pov





htn:


-cont home med





hld:


-cont statin

## 2020-09-03 NOTE — PN
Teaching Attending Note


Name of Resident: Chad Li





ATTENDING PHYSICIAN STATEMENT





I saw and evaluated the patient.


I reviewed the resident's note and discussed the case with the resident.


I agree with the resident's findings and plan as documented.








SUBJECTIVE:


65yoM with h/o obesity, HLD, uninephric s/p kidney donation 1998, and migraines 

who presents with acute onset chest pain since 2pm. Patient reports he has been 

in his usual state of health, no prior history of CAD or chest pain. After 

eating lunch he developed nonexertional band-like chest pressure, pain worst on 

the left, with radiation to bilateral jaws. Denies associated SOB or 

diaphoresis, no radiation to the back. The pain was constant and did not worsen 

or improve throughout the afternoon so he decided to come to the ED. Patient 

notes he had an abnormal EKG about a year ago and had an echocardiogram, has not

required cardiology follow up in the interim. No prior stress test. 





Patient was hemodynamically stable in the ED. EKG showed LBBB, no prior EKGs 

available for comparison. Initial troponin was negative, creatinine notable 1.6 

with unknown baseline. CXR showed nodule in the right midlung, no other acute 

processes. Chest pain improved s/p sublingual nitroglycerin x1. Patient also 

received aspirin 162mg and is admitted for further work up and management. 





ROS: 


(+) chest pain, headache after receiving nitro


(-) fever, cough, syncope, palpitations, nausea/vomiting





OBJECTIVE:


                               Vital Signs - 24 hr











  09/02/20





  21:00


 


Temperature 98.4 F


 


Pulse Rate 79


 


Respiratory 19





Rate 


 


Blood Pressure 147/85


 


O2 Sat by Pulse 97





Oximetry (%) 











Exam:


Gen: awake, alert, NAD


CV: RRR, no MRG appreciated. Chest pain non-reproducible to palpation


Resp: CTAB, unlabored breathing


GI: Soft, NT, ND


Ext: Trace pitting edema bilaterally


Neuro: AOx3, CN II-XII grossly intact














                         Laboratory Results - last 24 hr











  09/02/20 09/02/20 09/02/20





  21:15 21:15 21:15


 


WBC   10.4 H 


 


RBC   5.12 


 


Hgb   15.6 


 


Hct   46.1 


 


MCV   90.0 


 


MCH   30.4 


 


MCHC   33.8 


 


RDW   13.9 


 


Plt Count   185 


 


MPV   8.2 


 


Absolute Neuts (auto)   5.8 


 


Neutrophils %   55.8 


 


Lymphocytes %   27.1 


 


Monocytes %   7.9 


 


Eosinophils %   8.6 H 


 


Basophils %   0.6 


 


Nucleated RBC %   0 


 


PT with INR  11.20  


 


INR  0.95  


 


PTT (Actin FS)  26.3  


 


Sodium    138


 


Potassium    4.8


 


Chloride    102


 


Carbon Dioxide    30


 


Anion Gap    6 L


 


BUN    22.8 H


 


Creatinine    1.6 H


 


Est GFR (CKD-EPI)AfAm    52.36


 


Est GFR (CKD-EPI)NonAf    45.18


 


Random Glucose    110 H


 


Calcium    9.6


 


Magnesium    2.3


 


Total Bilirubin    0.9


 


AST    30


 


ALT    34


 


Alkaline Phosphatase    92


 


Creatine Kinase    101


 


Troponin I    < 0.02


 


B-Natriuretic Peptide    78.3


 


Total Protein    7.4


 


Albumin    3.8

















ASSESSMENT AND PLAN:





65yoM with h/o obesity, HLD, uninephric s/p kidney donation 1998, and migraines 

who presents with acute onset chest pain since 2pm admitted for ACS rule out.





Chest pain, ACS rule out


Does have numerous risk factors for CAD including obesity, HLD


Characteristics of his chest pain are concerning for unstable angina


s/p aspirin and nitroglycerin in ED with improvement; initial troponin negative


EKG with LBBB, no priors available





- check fasting lipid panel, A1c


- serial EKG


- tele


- troponins


- continue aspirin


- NPO overnight pending possible nuclear stress test


- cardiology consult in AM


- please obtain outpatient EKG for comparison if possible








Uninephric, elevated creatinine


Creatinine 1.6 on admission, baseline unknown





- gentle hydration overnight while NPO


- trend renal function


- avoid nephrotoxic meds


- obtain outpatient labs in AM if possible








Chronic, stable


HLD: high-intensity statin








DVT ppx: heparin subq

## 2020-10-16 ENCOUNTER — HOSPITAL ENCOUNTER (INPATIENT)
Dept: HOSPITAL 74 - JER | Age: 65
LOS: 4 days | Discharge: HOME | DRG: 291 | End: 2020-10-20
Attending: GENERAL ACUTE CARE HOSPITAL | Admitting: GENERAL ACUTE CARE HOSPITAL
Payer: COMMERCIAL

## 2020-10-16 VITALS — BODY MASS INDEX: 30.7 KG/M2

## 2020-10-16 DIAGNOSIS — K59.00: ICD-10-CM

## 2020-10-16 DIAGNOSIS — I50.43: ICD-10-CM

## 2020-10-16 DIAGNOSIS — Z90.5: ICD-10-CM

## 2020-10-16 DIAGNOSIS — I13.0: Primary | ICD-10-CM

## 2020-10-16 DIAGNOSIS — E83.39: ICD-10-CM

## 2020-10-16 DIAGNOSIS — D72.829: ICD-10-CM

## 2020-10-16 DIAGNOSIS — N18.30: ICD-10-CM

## 2020-10-16 DIAGNOSIS — R91.1: ICD-10-CM

## 2020-10-16 DIAGNOSIS — J18.9: ICD-10-CM

## 2020-10-16 DIAGNOSIS — I82.4Z2: ICD-10-CM

## 2020-10-16 DIAGNOSIS — I44.7: ICD-10-CM

## 2020-10-16 DIAGNOSIS — E78.5: ICD-10-CM

## 2020-10-16 DIAGNOSIS — E66.01: ICD-10-CM

## 2020-10-16 LAB
ALBUMIN SERPL-MCNC: 3.4 G/DL (ref 3.4–5)
ALP SERPL-CCNC: 81 U/L (ref 45–117)
ALT SERPL-CCNC: 33 U/L (ref 13–61)
ANION GAP SERPL CALC-SCNC: 7 MMOL/L (ref 8–16)
APPEARANCE UR: CLEAR
APTT BLD: 30.3 SECONDS (ref 25.2–36.5)
AST SERPL-CCNC: 19 U/L (ref 15–37)
BASOPHILS # BLD: 0.4 % (ref 0–2)
BILIRUB DIRECT SERPL-MCNC: 316 U/L (ref 87–246)
BILIRUB SERPL-MCNC: 1.3 MG/DL (ref 0.2–1)
BILIRUB UR STRIP.AUTO-MCNC: NEGATIVE MG/DL
BNP SERPL-MCNC: 374.3 PG/ML (ref 5–125)
BUN SERPL-MCNC: 18.6 MG/DL (ref 7–18)
CALCIUM SERPL-MCNC: 8.8 MG/DL (ref 8.5–10.1)
CHLORIDE SERPL-SCNC: 106 MMOL/L (ref 98–107)
CO2 SERPL-SCNC: 25 MMOL/L (ref 21–32)
COLOR UR: YELLOW
CREAT SERPL-MCNC: 1.5 MG/DL (ref 0.55–1.3)
DEPRECATED RDW RBC AUTO: 14.1 % (ref 11.9–15.9)
EOSINOPHIL # BLD: 3.4 % (ref 0–4.5)
GLUCOSE SERPL-MCNC: 89 MG/DL (ref 74–106)
HCT VFR BLD CALC: 43.8 % (ref 35.4–49)
HGB BLD-MCNC: 14.8 GM/DL (ref 11.7–16.9)
INR BLD: 1.09 (ref 0.83–1.09)
KETONES UR QL STRIP: NEGATIVE
LEUKOCYTE ESTERASE UR QL STRIP.AUTO: NEGATIVE
LYMPHOCYTES # BLD: 13.3 % (ref 8–40)
MAGNESIUM SERPL-MCNC: 2.4 MG/DL (ref 1.8–2.4)
MCH RBC QN AUTO: 30.3 PG (ref 25.7–33.7)
MCHC RBC AUTO-ENTMCNC: 33.8 G/DL (ref 32–35.9)
MCV RBC: 89.7 FL (ref 80–96)
MONOCYTES # BLD AUTO: 10.8 % (ref 3.8–10.2)
NEUTROPHILS # BLD: 72.1 % (ref 42.8–82.8)
NITRITE UR QL STRIP: NEGATIVE
PH UR: 7 [PH] (ref 5–8)
PHOSPHATE SERPL-MCNC: 2.7 MG/DL (ref 2.5–4.9)
PLATELET # BLD AUTO: 126 K/MM3 (ref 134–434)
PMV BLD: 7.8 FL (ref 7.5–11.1)
POTASSIUM SERPLBLD-SCNC: 4.6 MMOL/L (ref 3.5–5.1)
PROT SERPL-MCNC: 7.1 G/DL (ref 6.4–8.2)
PROT UR QL STRIP: NEGATIVE
PROT UR QL STRIP: NEGATIVE
PT PNL PPP: 13.4 SEC (ref 9.7–13)
RBC # BLD AUTO: 4.89 M/MM3 (ref 4–5.6)
SODIUM SERPL-SCNC: 138 MMOL/L (ref 136–145)
SP GR UR: 1.01 (ref 1.01–1.03)
UROBILINOGEN UR STRIP-MCNC: 0.2 MG/DL (ref 0.2–1)
WBC # BLD AUTO: 13.6 K/MM3 (ref 4–10)

## 2020-10-16 PROCEDURE — C9803 HOPD COVID-19 SPEC COLLECT: HCPCS

## 2020-10-16 PROCEDURE — A9540 TC99M MAA: HCPCS

## 2020-10-16 PROCEDURE — U0003 INFECTIOUS AGENT DETECTION BY NUCLEIC ACID (DNA OR RNA); SEVERE ACUTE RESPIRATORY SYNDROME CORONAVIRUS 2 (SARS-COV-2) (CORONAVIRUS DISEASE [COVID-19]), AMPLIFIED PROBE TECHNIQUE, MAKING USE OF HIGH THROUGHPUT TECHNOLOGIES AS DESCRIBED BY CMS-2020-01-R: HCPCS

## 2020-10-16 PROCEDURE — A9539 TC99M PENTETATE: HCPCS

## 2020-10-16 RX ADMIN — ENOXAPARIN SODIUM SCH MG: 100 INJECTION SUBCUTANEOUS at 20:40

## 2020-10-16 RX ADMIN — ENOXAPARIN SODIUM SCH: 100 INJECTION SUBCUTANEOUS at 21:46

## 2020-10-16 RX ADMIN — ATORVASTATIN CALCIUM SCH MG: 10 TABLET, FILM COATED ORAL at 22:52

## 2020-10-16 NOTE — PDOC
Documentation entered by Caterina Benitez SCRIBE, acting as scribe for Ben Farah MD.








Ben Farah MD:  This documentation has been prepared by the kartikeEmmanuel Ana, SCRIBE, under my direction and personally reviewed by me in its entirety.  I 

confirm that the documentation accurately reflects all work, treatment, 

procedures, and medical decision making performed by me.  





Attending Attestation





- Resident


Resident Name: RiosgordolorenzoElijah





- ED Attending Attestation


I have performed the following: I have examined & evaluated the patient, The 

case was reviewed & discussed with the resident, I agree w/resident's findings &

plan, Exceptions are as noted





- HPI


HPI: 





10/16/20 14:10


Patient is a 65 year old male with a significant past medical history of 

migraines and hyperlipidemia, who presents to the ED with worsening dyspnea and 

orthopnea x2 days. Patient stated that he has to "prop himself up" in order to 

sleep and that his symptoms worsen when he lays flat. Patient was seen at an 

Urgent care earlier today for the same symptoms.





Patient denies: weakness, fever, chills, confusion, cough, chest pain, 

palpitations, any lower extremity swelling, or any other related symptoms.





Allergies: morphine














- Physicial Exam


PE: 





10/16/20 14:11


See resident exam.








- Medical Decision Making








66yo M presents to the ED with dyspnea and orthopnea


Vitals with tachypnea


DDx includes CHF vs ACS vs PNA


No PE risk factors


Plan


-labs


-CXR


-anticipate admission for cardiac w/u





Discharge





- Discharge Information


Problems reviewed: Yes


Clinical Impression/Diagnosis: 


 SOB (shortness of breath) on exertion





Condition: Stable


Disposition: HOME





- Follow up/Referral





- Patient Discharge Instructions





- Post Discharge Activity

## 2020-10-16 NOTE — PN
Progress Note (short form)





- Note


Progress Note: 





Call received by day resident given pts d-dimer>17,000. Given pt's creatinine 

will not elect to obtain CTA with contrast at this time. Will obtain b/l 

duplexes of lower extremities and CT chest w/o contrast and empirically treat 

patient for PE with weight based and GFR based dosing of Lovenox. Spoke with 

pharmacist to confirm dosing. Nurse notified of these orders. Will follow up 

test results.

## 2020-10-16 NOTE — XMS
Summarization Of Episode

                           Created on:2020



Patient:LUIS FERNANDO HARTLEY

Sex:Male

:1955

External Reference #:[id number]





Demographics







                          Address                   [address] [place] [address]

 

                          Home Phone                [phone] [place]

 

                          Work Phone                [phone]

 

                          Preferred Language        English

 

                          Marital Status            Unknown

 

                          Pentecostalism Affiliation     [place]

 

                          Race                      WH

 

                          Ethnic Group              Unknown









Author







                          Organization              [facility]









Support







                Name            Relationship    Address         Phone

 

                [employer]  Unavailable     [address]    [phone]



                                                [place] [address] 

 

                RADHA HARTLEY  WIFE            [address] [facility] [phone] [place] [address] 

 

                RADHA HARTLEY  Spouse          [address] [facility] [address]

le



                                                [place] [address] 









Re-disclosure Warning

The records that you are about to access may contain information from federally-
assisted alcohol or drug abuse programs. If such information is present, then 
the following federally mandated warning applies: This information has been 
disclosed to you from records protected by federal confidentiality rules (42 CFR
part 2). The federal rules prohibit you from making any further disclosure of 
this information unless further disclosure is expressly permitted by the written
consent of the person to whom it pertains or as otherwise permitted by 42 CFR 
part 2. A general authorization for the release of medical or other information 
is NOT sufficient for this purpose. The Federal rules restrict any use of the 
information to criminally investigate or prosecute any alcohol or drug abuse 
patient.The records that you are about to access may contain highly sensitive 
health information, the redisclosure of which is protected by Article 27-F of 
the [facility] Public Health law. If you continue you may haveaccess to 
information: Regarding HIV / AIDS; Provided by facilities licensed or operated 
by the [facility] Office of Mental Health; or Provided by the [facility]
Office for People With Developmental Disabilities. If such information is 
present, then the following [place] mandated warning applies: This 
information has been disclosed to you from confidential records which are 
protected by state law. State law prohibits you from making any further 
disclosure of this information without the specific written consent of the 
person to whom it pertains, or as otherwise permitted by law. Any unauthorized 
further disclosure in violation of state law may result in a fine or group home 
sentence or both. A general authorization for the release of medical or other 
information is NOT sufficient authorization for further disclosure.



Insurance Providers







          Payer name Policy type Policy ID Covered   Covered party's Policy    P

jana



                    / Coverage           party ID  relationship to Perez    Inf

ormation



                    type                          perez              

 

          [employer]               [id number]           SP                  [id number]



          HEALTHCARE PPO                                                   

 

          [employer] PPO           [id number]           [place]                  [id number]

6







Results







                    ID                  Date                Data Source

 

                    [id number]         2020 10:00:00 PM EDT [facility]











          Name      Value     Range     Interpretation Description Data      Sup

porting



                                        Code                Source(s) Document(s

)

 

          SARS                                              [facility]   



          coronavirus 2                                                   



          RNA                                                         









                                        This lab was ordered by [facility] and reported by [facility].











                                        Procedure

## 2020-10-16 NOTE — PN
Progress Note (short form)





- Note


Progress Note: 


Call received from Dr. Payne radiologist on call. Imaging findings were 

discussed. Of note: patient was found to have a L Leg DVT and a small opacity in

lower left lobe which is suspicious for a PE. Patient also had an incidental 1.3

cm nodule in the R lower lobe that was recommended for 3 week f/u for CT or PET 

scan. DVT like provoked from hypercoaguable state 2/2 malignancy. Outpatient f/u

recommended for hypercoaguable workup since patient was already started on AC. 

Spoke with admitting attending Dr. Alfaro and he agrees with the following 

plan. 





A/P


#DVT w/ PE 


- echo to r/o Right heart strain


-continue with Lovenox 100 mg BID 





#Incidental R lobe nodule


-on call pulm Dr. Lawson was consulted; contacted and aware 


-outpt CT or PET f/u in 3 weeks

## 2020-10-16 NOTE — HP
CHIEF COMPLAINT: shortness of breath





PCP:





HISTORY OF PRESENT ILLNESS:


Patient is a 65 year old male with history of hyperlipidemia, migraines, 

obesity, CKD presents with complaint of shortness of breath. He endorses 

symptoms ongoing for the past two days without clear inciting features. He 

noticed yesterday that he became suddenly short of breath after picking up his 

40 pound grandson -usually he is able to ride bicycle and walk without any 

shortness of breath. Last evening patient endorses having to sleep sitting 

upright due to shotness of breath. Patient initially went to Urgent Care earlier

today, however was noted that he desaturated to low 90s with light physical 

activity, prompting ED presentation. He does endorse diffuse myalgias. Denies 

sick contacts. 


Of note, patient had cardiac workup in September of this year with stress test 

which revealed EF low normal to 50%. 





ER course was notable for:


(1) Chest radiograph


(2)


(3)





Recent Travel: denies





PAST MEDICAL HISTORY: hyperlipidemia, migraines, obesity, CKD 





PAST SURGICAL HISTORY: tonsilectomy, kidney donation





Social History: Works as  from home. Independent in activities 

of daily living


Smoking: Denies


Alcohol: Denies


Drugs: Denies





Allergies





morphine Adverse Reaction (Mild, Verified 10/16/20 13:28)


   


   agitation  








HOME MEDICATIONS:


                                Home Medications











 Medication  Instructions  Recorded


 


Ciclopirox Olamine [Ciclopirox] 30 gm TP BID 09/03/20


 


Lovastatin 40 mg PO DAILY 09/03/20


 


Nortriptyline HCl [Pamelor -] 50 mg PO HS 09/03/20


 


propRANOLol HCL [Propranolol HCl 120 mg PO HS 09/03/20





ER]  








REVIEW OF SYSTEMS


CONSTITUTIONAL: 


Admits: malaise. Absent:  fever, chills, diaphoresis, generalized weakness, loss

of appetite, weight change


HEENT: 


Absent:  rhinorrhea, nasal congestion, throat pain, throat swelling, difficulty 

swallowing, mouth swelling, ear pain, eye pain, visual changes


CARDIOVASCULAR: 


Absent: chest pain, syncope, palpitations, irregular heart rate, 

lightheadedness, peripheral edema


RESPIRATORY: 


Admits: shortness of breath, dyspnea with exertion, orthopnea. Absent: cough, 

wheezing, stridor, hemoptysis


GASTROINTESTINAL:


Absent: abdominal pain, abdominal distension, nausea, vomiting, diarrhea, 

constipation, melena, hematochezia


GENITOURINARY: 


Absent: dysuria, frequency, urgency, hesitancy, hematuria, flank pain, genital 

pain


MUSCULOSKELETAL: 


Admits: diffuse myalgias. Absent: joint swelling, back pain, neck pain


SKIN: 


Absent: rash, itching, pallor


HEMATOLOGIC/IMMUNOLOGIC: 


Absent: easy bleeding, easy bruising, lymphadenopathy, frequent infections


ENDOCRINE:


Absent: unexplained weight gain, unexplained weight loss, heat intolerance, cold

intolerance


NEUROLOGIC: 


Absent: headache, focal weakness or paresthesias, dizziness, unsteady gait, 

seizure, mental status changes, bladder or bowel incontinence


PSYCHIATRIC: 


Absent: anxiety, depression, suicidal or homicidal ideation, hallucinations.








PHYSICAL EXAMINATION


                               Vital Signs - 24 hr











  10/16/20





  13:29


 


Temperature 98.7 F


 


Pulse Rate 88


 


Respiratory 22 H





Rate 


 


Blood Pressure 138/76


 


O2 Sat by Pulse 96





Oximetry (%) 








GENERAL: The patient is awake, alert, and fully oriented, in no acute distress.


HEAD: Normocephalic, atraumatic. 


EYES: PERRL, extraocular movements intact, sclera anicteric, conjunctiva clear. 


ENT: Oropharynx clear, without erythema or exudates. Moist mucous membranes.


NECK: Trachea midline, full range of motion. Supple without lymphadenopathy.


LUNGS: Breath sounds equal, clear to auscultation bilaterally. No wheezes, no 

crackles. No accessory muscle use. 


HEART: Regular rate and rhythm. S1, S2 without murmur, rub or gallop.


ABDOMEN: Soft, nondistended, nontender to light and deep palpation x4 quadrants.

No rebound tenderness, no guarding. Normoactive bowel sounds x4 quadrants. No 

hepatosplenomegaly, no masses appreciated.


EXTREMITIES: 2+ radial, dorsalis pedis pulses bilaterally. Warm, well-perfused. 

No lower extremity edema bilaterally.


NEUROLOGICAL: Cranial nerves II through XII grossly intact. Normal speech. No 

gross focal deficits. 


PSYCH: Normal mood, normal affect upon my encounter. 


SKIN: Warm, dry.





                         Laboratory Results - last 24 hr











  10/16/20 10/16/20 10/16/20





  14:15 14:15 14:15


 


WBC  13.6 H  


 


RBC  4.89  


 


Hgb  14.8  


 


Hct  43.8  


 


MCV  89.7  


 


MCH  30.3  


 


MCHC  33.8  


 


RDW  14.1  


 


Plt Count  126 L D  


 


MPV  7.8  


 


Absolute Neuts (auto)  9.8 H  


 


Neutrophils %  72.1  D  


 


Lymphocytes %  13.3  D  


 


Monocytes %  10.8 H  


 


Eosinophils %  3.4  


 


Basophils %  0.4  


 


Nucleated RBC %  0  


 


PT with INR   


 


INR   


 


PTT (Actin FS)   


 


Sodium   138 


 


Potassium   4.6 


 


Chloride   106 


 


Carbon Dioxide   25 


 


Anion Gap   7 L 


 


BUN   18.6 H 


 


Creatinine   1.5 H 


 


Est GFR (CKD-EPI)AfAm   56.61 


 


Est GFR (CKD-EPI)NonAf   48.84 


 


Random Glucose   89 


 


Calcium   8.8 


 


Phosphorus   2.7 


 


Magnesium    2.4


 


Ferritin    262.4


 


Total Bilirubin   1.3 H 


 


AST   19 


 


ALT   33 


 


Alkaline Phosphatase   81 


 


LD Total    316 H


 


Creatine Kinase   61 


 


Troponin I   < 0.02 


 


C-Reactive Protein    4.7 H


 


B-Natriuretic Peptide    374.3 H


 


Total Protein   7.1 


 


Albumin   3.4 














  10/16/20





  14:15


 


WBC 


 


RBC 


 


Hgb 


 


Hct 


 


MCV 


 


MCH 


 


MCHC 


 


RDW 


 


Plt Count 


 


MPV 


 


Absolute Neuts (auto) 


 


Neutrophils % 


 


Lymphocytes % 


 


Monocytes % 


 


Eosinophils % 


 


Basophils % 


 


Nucleated RBC % 


 


PT with INR  13.40 H


 


INR  1.09


 


PTT (Actin FS)  30.3


 


Sodium 


 


Potassium 


 


Chloride 


 


Carbon Dioxide 


 


Anion Gap 


 


BUN 


 


Creatinine 


 


Est GFR (CKD-EPI)AfAm 


 


Est GFR (CKD-EPI)NonAf 


 


Random Glucose 


 


Calcium 


 


Phosphorus 


 


Magnesium 


 


Ferritin 


 


Total Bilirubin 


 


AST 


 


ALT 


 


Alkaline Phosphatase 


 


LD Total 


 


Creatine Kinase 


 


Troponin I 


 


C-Reactive Protein 


 


B-Natriuretic Peptide 


 


Total Protein 


 


Albumin 











ASSESSMENT/PLAN:


Patient is a 65 year old male with history of hyperlipidemia, migraines, 

obesity, CKD presents with complaint of shortness of breath.





Dyspnea on exertion


 -Cannot rule out cardiac etiology however patient had stress test one month ago

with EF 52%. Transthoracic ECHO at that time did not reveal wall motion 

abnormalities. Currently not fluid overloaded, and saturating well on room air. 

Will not initiate diuretics at this juncture. 


 -Suspect viral etiology given diffuse myalgias. 


 -Influenza A,B, RSV,COVID 19 swab


 -Low likelyhood of PE. Will obtain D-Dimer, if greater than 1000 will obtain 

CTA chest


 -Follow blood, urine cultures


 -Monitoring off antibiotics for now, given patient is afebrile, and no clear 

source of infection despite WBC count. Low threshold to initiate broad spectrum 

antibiotics if patient becomes febrile


  -Cardiac telemetry monitoring


  -Sleep apnea screening


 


History of hyperlipidemia


 -Continue home statin





History of Migraines- 


Continue home Propranolol





FEN


 -No IV fluids indicated


 -Follow BMP


 -Sodium modified diet





-Prophylaxis


 -Heparin 500units subq TID





Disposition


 -Admit to Telemetry floor





 











Family Medical History


Family History: As Documented





Visit type





- Emergency Visit


Emergency Visit: Yes


ED Registration Date: 10/16/20


Care time: The patient presented to the Emergency Department on the above date 

and was hospitalized for further evaluation of their emergent condition.





- New Patient


This patient is new to me today: Yes


Date on this admission: 10/16/20





- Critical Care


Critical Care patient: No





ATTENDING PHYSICIAN STATEMENT





I saw and evaluated the patient.


I reviewed the resident's note and discussed the case with the resident.


I agree with the resident's findings and plan as documented.








SUBJECTIVE:








OBJECTIVE:








ASSESSMENT AND PLAN:

## 2020-10-16 NOTE — PDOC
History of Present Illness





- General


Chief Complaint: Shortness of Breath


Stated Complaint: SHORTNESS OF BREATH


Time Seen by Provider: 10/16/20 13:39


History Source: Patient





- History of Present Illness


Initial Comments: 





10/16/20 14:13


65M w/hx HLD, migraines p/w two days of worsening dyspnea, orthopnea. He reports

that two days ago he realized that he was increasingly short of breath with 

exertion while walking around the house. He reports dutifully remaining in 

quarantine and maintaining safe social distancing, except for occasionally 

watching his 5yo granddaughter. He reports that he has needed to prop himself up

to sleep at night, and that his sob worsens while laying flat. He reports 

presenting to Urgent Care today for his worsening shortness of breath and 

dyspnea, and was found to have O2% to 96% on room air that dropped to 90% with 

ambulation. He denies any lower extremity swelling, fevers, chills, cough, 

weakness, confusion, chest pain, or palpitations. 














Past History





- Medical History


Allergies/Adverse Reactions: 


                                    Allergies











Allergy/AdvReac Type Severity Reaction Status Date / Time


 


morphine AdvReac Mild  Verified 10/16/20 13:28











Home Medications: 


Ambulatory Orders





Ciclopirox Olamine [Ciclopirox] 30 gm TP BID 09/03/20 


Lovastatin 40 mg PO DAILY 09/03/20 


Nortriptyline HCl [Pamelor -] 50 mg PO HS 09/03/20 


propRANOLol HCL [Propranolol HCl ER] 120 mg PO HS 09/03/20 








Anemia: No


Asthma: No


Cancer: No


Cardiac Disorders: No


CVA: No


COPD: No


CHF: No


Dementia: No


Diabetes: No


GI Disorders: No


 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


HTN: No


Hypercholesterolemia: Yes


Seizures: No


Thyroid Disease: No





- Surgical History


Cardiac Surgery: No


Lung Surgery: No


Neurologic Surgery: No


Orthopedic Surgery: Yes (BACK SURGERY)





- Psycho-Social/Smoking History


Smoking Status: No


Smoking History: Never smoked


Have you smoked in the past 12 months: No


Number of Cigarettes Smoked Daily: 0





- Substance Abuse Hx (Audit-C & DAST Scrn)


How often the patient has a drink containing alcohol: Never


Score: In Men: 4 or > Positive; In Women: 3 or > Positive: 0


Screen Result (Pos requires Nsg. Audit-10AR): Negative


In the last yr the pt used illegal drug/Rx for NonMed reason: No


Score:  Yes response is considered Positive: 0


Screen Result (Positive result requires Nsg. DAST-10): Negative





**Review of Systems





- Review of Systems


Able to Perform ROS?: Yes


Comments:: 





10/16/20 15:01


GENERAL/CONSTITUTIONAL: No fever or chills. No weakness.


HEAD, EYES, EARS, NOSE AND THROAT: No change in vision. No ear pain or dischar

ge. No sore throat.


CARDIOVASCULAR: Shortness of breath. Orthopnea. No chest pain


RESPIRATORY: No cough, wheezing, or hemoptysis.


GASTROINTESTINAL: No nausea, vomiting, diarrhea or constipation.


GENITOURINARY: No dysuria, frequency, or change in urination.


MUSCULOSKELETAL: No joint or muscle swelling or pain. No neck or back pain.


SKIN: No rash


NEUROLOGIC: No headache, vertigo, loss of consciousness, or change in 

strength/sensation.


ENDOCRINE: No increased thirst. No abnormal weight change


HEMATOLOGIC/LYMPHATIC: No anemia, easy bleeding, or history of blood clots.


ALLERGIC/IMMUNOLOGIC: No hives or skin allergy.








*Physical Exam





- Vital Signs


                                Last Vital Signs











Temp Pulse Resp BP Pulse Ox


 


 98.7 F   88   22 H  138/76   96 


 


 10/16/20 13:29  10/16/20 13:29  10/16/20 13:29  10/16/20 13:29  10/16/20 13:29














- Physical Exam





10/16/20 15:04


GENERAL: Awake, alert, and fully oriented, in no acute distress


HEAD: No signs of trauma, normocephalic, atraumatic 


EYES: PERRLA, EOMI, sclera anicteric, conjunctiva clear


ENT: Auricles normal inspection, hearing grossly normal, nares patent, 

oropharynx clear without exudates. Moist mucosa


NECK: Normal ROM, supple, no lymphadenopathy, JVD, or masses


LUNGS: No distress, speaks full sentences, clear to auscultation bilaterally 


HEART: Regular rate and rhythm, normal S1 and S2, no murmurs, rubs or gallops, 

peripheral pulses normal and equal bilaterally. 


ABDOMEN: Soft, nontender, normoactive bowel sounds.  No guarding, no rebound.  

No masses


EXTREMITIES : Normal inspection, Normal range of motion, no edema.  No clubbing 

or cyanosis


NEUROLOGICAL: Cranial nerves II through XII grossly intact.  Normal speech, 

normal gait, no focal sensorimotor deficits 


SKIN: Warm, Dry, normal turgor, no rashes or lesions noted














ED Treatment Course





- LABORATORY


CBC & Chemistry Diagram: 


                                 10/16/20 14:15





                                 10/16/20 14:15





Medical Decision Making





- Medical Decision Making





10/16/20 15:12


65M w.hx HLD, remote nephrectomy (donated) p/w two days of worsening sob, 

orthopnea, body aches, fatigue, with prior EKG showing LBBB without change 

today, hypoxia on exertion at urgent care. Ddx ACS given cardiac history, 

worsening sob. New onset CHF possible given worsening orthopnea, sob, cardiac 

history. COVID-19 possible, while patient has been in quarantine has regular 

exposure to granddaughter. 





Plan: 


CBC


CMP


EKG


CXR


Troponin


BNP


PT/INR, APTT


LDH


CRP


Ferritin


Mag


Phos





Dispo: 


Admit 





10/16/20 15:48


WBC - 13.6


BNP - 347 (100s in september)


Cr - 1.5 (baseline)


O2% remain wnl on room air while at rest


LDH - 316


CRP - 4.9





Plan for admission for new onset CHF. Early covid possible given elevated 

inflammatory markers, vs other infectious etiology precipitating CHF

















Discharge





- Discharge Information


Problems reviewed: Yes


Clinical Impression/Diagnosis: 


 SOB (shortness of breath) on exertion





Condition: Stable


Disposition: HOME





- Follow up/Referral





- Patient Discharge Instructions





- Post Discharge Activity

## 2020-10-16 NOTE — PN
Teaching Attending Note


Name of Resident: Clay Whitaker





ATTENDING PHYSICIAN STATEMENT





I saw and evaluated the patient.


I reviewed the resident's note and discussed the case with the resident.


I agree with the resident's findings and plan as documented.








SUBJECTIVE: Patient seen and examined at bedside, endorses sudden episode of SOB

after playing with his grandson 2-3 days ago, progressively getting worse. 

Hypoxic on exam. VSS. 








OBJECTIVE:





GA comfortable, AAox3, speaking in full sentences


HEENT NC/aT, wide neck circumference, dry MM, no JVD


Chest Good air entry b/l, no crackles or wheezing


CVS S1, S2+, RRR


ABd obese, Soft, NT, BS+


Ext no LE edema, some LLE calf tenderness





                             Vital Signs (72 hours)











  10/16/20 10/16/20 10/16/20





  13:29 17:43 19:26


 


Temperature 98.7 F 98.3 F 


 


Pulse Rate 88  


 


Pulse Rate [   





Apical]   


 


Pulse Rate [  79 





Left Radial]   


 


Respiratory 22 H 16 





Rate   


 


Blood Pressure 138/76  


 


Blood Pressure  125/68 





[Left Arm]   


 


O2 Sat by Pulse 96 93 L 96





Oximetry (%)   














  10/16/20





  22:40


 


Temperature 


 


Pulse Rate 


 


Pulse Rate [ 109 H





Apical] 


 


Pulse Rate [ 





Left Radial] 


 


Respiratory 20





Rate 


 


Blood Pressure 


 


Blood Pressure 128/87





[Left Arm] 


 


O2 Sat by Pulse 100





Oximetry (%) 








                         Laboratory Results - last 24 hr











  10/16/20 10/16/20 10/16/20





  14:15 14:15 14:15


 


WBC  13.6 H  


 


RBC  4.89  


 


Hgb  14.8  


 


Hct  43.8  


 


MCV  89.7  


 


MCH  30.3  


 


MCHC  33.8  


 


RDW  14.1  


 


Plt Count  126 L D  


 


MPV  7.8  


 


Absolute Neuts (auto)  9.8 H  


 


Neutrophils %  72.1  D  


 


Lymphocytes %  13.3  D  


 


Monocytes %  10.8 H  


 


Eosinophils %  3.4  


 


Basophils %  0.4  


 


Nucleated RBC %  0  


 


PT with INR   


 


INR   


 


PTT (Actin FS)   


 


D-Dimer   


 


Sodium   138 


 


Potassium   4.6 


 


Chloride   106 


 


Carbon Dioxide   25 


 


Anion Gap   7 L 


 


BUN   18.6 H 


 


Creatinine   1.5 H 


 


Est GFR (CKD-EPI)AfAm   56.61 


 


Est GFR (CKD-EPI)NonAf   48.84 


 


Random Glucose   89 


 


Calcium   8.8 


 


Phosphorus   2.7 


 


Magnesium    2.4


 


Ferritin    262.4


 


Total Bilirubin   1.3 H 


 


AST   19 


 


ALT   33 


 


Alkaline Phosphatase   81 


 


LD Total    316 H


 


Creatine Kinase   61 


 


Troponin I   < 0.02 


 


C-Reactive Protein    4.7 H


 


B-Natriuretic Peptide    374.3 H


 


Total Protein   7.1 


 


Albumin   3.4 


 


Urine Color   


 


Urine Appearance   


 


Urine pH   


 


Ur Specific Gravity   


 


Urine Protein   


 


Urine Glucose (UA)   


 


Urine Ketones   


 


Urine Blood   


 


Urine Nitrite   


 


Urine Bilirubin   


 


Urine Urobilinogen   


 


Ur Leukocyte Esterase   


 


Influenza A (Rapid)   


 


Influenza B (Rapid)   


 


RSV Rapid   














  10/16/20 10/16/20 10/16/20





  14:15 14:15 17:15


 


WBC   


 


RBC   


 


Hgb   


 


Hct   


 


MCV   


 


MCH   


 


MCHC   


 


RDW   


 


Plt Count   


 


MPV   


 


Absolute Neuts (auto)   


 


Neutrophils %   


 


Lymphocytes %   


 


Monocytes %   


 


Eosinophils %   


 


Basophils %   


 


Nucleated RBC %   


 


PT with INR  13.40 H  


 


INR  1.09  


 


PTT (Actin FS)  30.3  


 


D-Dimer   86515 H 


 


Sodium   


 


Potassium   


 


Chloride   


 


Carbon Dioxide   


 


Anion Gap   


 


BUN   


 


Creatinine   


 


Est GFR (CKD-EPI)AfAm   


 


Est GFR (CKD-EPI)NonAf   


 


Random Glucose   


 


Calcium   


 


Phosphorus   


 


Magnesium   


 


Ferritin   


 


Total Bilirubin   


 


AST   


 


ALT   


 


Alkaline Phosphatase   


 


LD Total   


 


Creatine Kinase   


 


Troponin I   


 


C-Reactive Protein   


 


B-Natriuretic Peptide   


 


Total Protein   


 


Albumin   


 


Urine Color   


 


Urine Appearance   


 


Urine pH   


 


Ur Specific Gravity   


 


Urine Protein   


 


Urine Glucose (UA)   


 


Urine Ketones   


 


Urine Blood   


 


Urine Nitrite   


 


Urine Bilirubin   


 


Urine Urobilinogen   


 


Ur Leukocyte Esterase   


 


Influenza A (Rapid)    Negative


 


Influenza B (Rapid)    Negative


 


RSV Rapid   














  10/16/20 10/16/20





  17:15 17:53


 


WBC  


 


RBC  


 


Hgb  


 


Hct  


 


MCV  


 


MCH  


 


MCHC  


 


RDW  


 


Plt Count  


 


MPV  


 


Absolute Neuts (auto)  


 


Neutrophils %  


 


Lymphocytes %  


 


Monocytes %  


 


Eosinophils %  


 


Basophils %  


 


Nucleated RBC %  


 


PT with INR  


 


INR  


 


PTT (Actin FS)  


 


D-Dimer  


 


Sodium  


 


Potassium  


 


Chloride  


 


Carbon Dioxide  


 


Anion Gap  


 


BUN  


 


Creatinine  


 


Est GFR (CKD-EPI)AfAm  


 


Est GFR (CKD-EPI)NonAf  


 


Random Glucose  


 


Calcium  


 


Phosphorus  


 


Magnesium  


 


Ferritin  


 


Total Bilirubin  


 


AST  


 


ALT  


 


Alkaline Phosphatase  


 


LD Total  


 


Creatine Kinase  


 


Troponin I  


 


C-Reactive Protein  


 


B-Natriuretic Peptide  


 


Total Protein  


 


Albumin  


 


Urine Color   Yellow


 


Urine Appearance   Clear


 


Urine pH   7.0


 


Ur Specific [place]   1.011


 


Urine Protein   Negative


 


Urine Glucose (UA)   Negative


 


Urine Ketones   Negative


 


Urine Blood   Negative


 


Urine Nitrite   Negative


 


Urine Bilirubin   Negative


 


Urine Urobilinogen   0.2


 


Ur Leukocyte Esterase   Negative


 


Influenza A (Rapid)  


 


Influenza B (Rapid)  


 


RSV Rapid  Negative 








                                Home Medications











 Medication  Instructions  Recorded


 


Ciclopirox Olamine [Ciclopirox] 30 gm TP BID 09/03/20


 


Lovastatin 40 mg PO DAILY 09/03/20


 


Nortriptyline HCl [Pamelor -] 50 mg PO HS 09/03/20


 


propRANOLol HCL [Propranolol HCl 120 mg PO HS 09/03/20





ER]  








                               Current Medications











Generic Name Dose Route Start Last Admin





  Trade Name Tom  PRN Reason Stop Dose Admin


 


Atorvastatin Calcium  10 mg  10/16/20 22:00 





  Lipitor -  PO  





  HS CORDELL  


 


Enoxaparin Sodium  100 mg  10/16/20 22:00  10/16/20 21:46





  Lovenox -  SQ   Not Given





  BID CORDELL  


 


Nortriptyline HCl  50 mg  10/16/20 22:00  10/16/20 22:39





  Pamelor -  PO   50 mg





  HS CORDELL   Administration


 


Propranolol HCl  120 mg  10/16/20 22:00  10/16/20 22:39





  Inderal La -  PO   120 mg





  HS CORDELL   Administration














ASSESSMENT AND PLAN:





65 M





Suspicious for PE


R/o COVID pneumonitis


Kidney donation in 1998


CKD


HLD


Migraine headaches


Obesity


r/o TRAM





Plan:


Would rather not subject patient to IV contrast (as patient has CKD and 1 

kidney) for CTA of chest


Obtain D-dimer, get LE doppler study, if either are positive start therapeutic 

Lovenox due to high suspicion for PE


Obtain CT chest w/o contrast to r/o COVID


Monitor O2 sat, supplement as needed


Pulmonary evaluation


Send inflammatory markers


Follow COVID/RSV/FLU/strep/Legionella


DVT ppx: Lovenox SC

## 2020-10-16 NOTE — PDOC
Rapid Medical Evaluation


Chief Complaint: Shortness of Breath


Time Seen by Provider: 10/16/20 13:39


Medical Evaluation: 


                                    Allergies











Allergy/AdvReac Type Severity Reaction Status Date / Time


 


morphine AdvReac Mild  Verified 10/16/20 13:28








Vital Signs











Temp Pulse Resp BP Pulse Ox


 


 98.7 F   88   22 H  138/76   96 


 


 10/16/20 13:29  10/16/20 13:29  10/16/20 13:29  10/16/20 13:29  10/16/20 13:29








10/16/20 13:40


I have performed a brief in-person evaluation of this patient.


The patient presents with a chief complaint of: Patient with no significant past

medical history present with complaint of 3-day history of persistent shortness 

of breath worsens when he goes up a flight of stairs.  Patient was seen in 

urgent care today for shortness of breath and was advised to come to emergency 

room.  Denies chest pain, palpitation, cough, fever, chills, dizziness.  Patient

present with a EKG from urgent care which shows bundle branch block is with some

PVCs.  Denies any other symptoms


Pertinent physical exam findings: Regular heart rate and rhythm.  Lungs clear to

auscultation bilateral with patient with mild labored breathing


I have ordered the following: EKG


The patient will proceed to the ED for further evaluation.





**Discharge Disposition





- Diagnosis


 SOB (shortness of breath) on exertion








- Discharge Dispostion


Condition at time of disposition: Stable





- Referrals





- Patient Instructions





- Post Discharge Activity

## 2020-10-16 NOTE — XMS
Summarization Of Episode

                           Created on:2020



Patient:LUIS FERNANDO HARTLEY

Sex:Male

:1955

External Reference #:[id number]





Demographics







                          Address                   [address] [place] [address]

 

                          Home Phone                [phone] [place]

 

                          Work Phone                [phone]

 

                          Preferred Language        English

 

                          Marital Status            Unknown

 

                          Islam Affiliation     CA

 

                          Race                      WH

 

                          Ethnic Group              Unknown









Author







                          Organization              [facility]









Support







                Name            Relationship    Address         Phone

 

                [employer]  Unavailable     [address]    [phone]



                                                [place] [address] 

 

                RADHA HARTLEY  WIFE            [address] [facility] [phone] [place] [address] 

 

                RADHA HARTLEY  Spouse          [address] [facility] [address]

le



                                                [place] [address] 









Re-disclosure Warning

The records that you are about to access may contain information from federally-
assisted alcohol or drug abuse programs. If such information is present, then 
the following federally mandated warning applies: This information has been 
disclosed to you from records protected by federal confidentiality rules (42 CFR
part 2). The federal rules prohibit you from making any further disclosure of 
this information unless further disclosure is expressly permitted by the written
consent of the person to whom it pertains or as otherwise permitted by 42 CFR 
part 2. A general authorization for the release of medical or other information 
is NOT sufficient for this purpose. The Federal rules restrict any use of the 
information to criminally investigate or prosecute any alcohol or drug abuse 
patient.The records that you are about to access may contain highly sensitive 
health information, the redisclosure of which is protected by Article 27-F of 
the [facility] Public Health law. If you continue you may haveaccess to 
information: Regarding HIV / AIDS; Provided by facilities licensed or operated 
by the [facility] Office of Mental Health; or Provided by the [facility]
Office for People With Developmental Disabilities. If such information is 
present, then the following [place] mandated warning applies: This 
information has been disclosed to you from confidential records which are 
protected by state law. State law prohibits you from making any further 
disclosure of this information without the specific written consent of the 
person to whom it pertains, or as otherwise permitted by law. Any unauthorized 
further disclosure in violation of state law may result in a fine or alf 
sentence or both. A general authorization for the release of medical or other 
information is NOT sufficient authorization for further disclosure.



Insurance Providers







          Payer name Policy type Policy ID Covered   Covered party's Policy    P

jana



                    / Coverage           party ID  relationship to Perez    Inf

ormation



                    type                          perez              

 

          [employer]               [id number]           SP                  [id number]



          HEALTHCARE PPO                                                   

 

          [employer] PPO           [id number]           SP                  [id number]

6







Results







                    ID                  Date                Data Source

 

                    [id number]         2020 10:00:00 PM EDT [facility]











          Name      Value     Range     Interpretation Description Data      Sup

porting



                                        Code                Source(s) Document(s

)

 

          SARS                                              [facility]   



          coronavirus 2                                                   



          RNA                                                         









                                        This lab was ordered by [facility] and reported by [facility].











                                        Procedure

## 2020-10-17 LAB
ALBUMIN SERPL-MCNC: 3.2 G/DL (ref 3.4–5)
ALP SERPL-CCNC: 72 U/L (ref 45–117)
ALT SERPL-CCNC: 31 U/L (ref 13–61)
ANION GAP SERPL CALC-SCNC: 5 MMOL/L (ref 8–16)
AST SERPL-CCNC: 18 U/L (ref 15–37)
BILIRUB SERPL-MCNC: 1.5 MG/DL (ref 0.2–1)
BUN SERPL-MCNC: 17.7 MG/DL (ref 7–18)
CALCIUM SERPL-MCNC: 8.7 MG/DL (ref 8.5–10.1)
CHLORIDE SERPL-SCNC: 106 MMOL/L (ref 98–107)
CO2 SERPL-SCNC: 28 MMOL/L (ref 21–32)
CREAT SERPL-MCNC: 1.5 MG/DL (ref 0.55–1.3)
DEPRECATED RDW RBC AUTO: 14.1 % (ref 11.9–15.9)
GLUCOSE SERPL-MCNC: 111 MG/DL (ref 74–106)
HCT VFR BLD CALC: 43.3 % (ref 35.4–49)
HGB BLD-MCNC: 14.7 GM/DL (ref 11.7–16.9)
MAGNESIUM SERPL-MCNC: 2.3 MG/DL (ref 1.8–2.4)
MCH RBC QN AUTO: 30.8 PG (ref 25.7–33.7)
MCHC RBC AUTO-ENTMCNC: 33.9 G/DL (ref 32–35.9)
MCV RBC: 90.6 FL (ref 80–96)
PHOSPHATE SERPL-MCNC: 2.5 MG/DL (ref 2.5–4.9)
PLATELET # BLD AUTO: 104 K/MM3 (ref 134–434)
PMV BLD: 8 FL (ref 7.5–11.1)
POTASSIUM SERPLBLD-SCNC: 4.7 MMOL/L (ref 3.5–5.1)
PROT SERPL-MCNC: 6.9 G/DL (ref 6.4–8.2)
RBC # BLD AUTO: 4.78 M/MM3 (ref 4–5.6)
SODIUM SERPL-SCNC: 139 MMOL/L (ref 136–145)
WBC # BLD AUTO: 14 K/MM3 (ref 4–10)

## 2020-10-17 RX ADMIN — NORTRIPTYLINE HYDROCHLORIDE SCH MG: 25 CAPSULE ORAL at 21:22

## 2020-10-17 RX ADMIN — ENOXAPARIN SODIUM SCH MG: 100 INJECTION SUBCUTANEOUS at 10:04

## 2020-10-17 RX ADMIN — ATORVASTATIN CALCIUM SCH MG: 10 TABLET, FILM COATED ORAL at 21:22

## 2020-10-17 RX ADMIN — ENOXAPARIN SODIUM SCH MG: 100 INJECTION SUBCUTANEOUS at 21:23

## 2020-10-17 RX ADMIN — FUROSEMIDE SCH MG: 20 TABLET ORAL at 16:11

## 2020-10-17 NOTE — CON.NEP
Consult


Consult Specialty:: Nephrology


Referred by:: abhishek


Reason for Consultation:: ckd may need contrast study





- History of Present Illness


Chief Complaint: sob


History of Present Illness: 





s/p dvt


r/o PE


CKD

















- Past Medical History


CNS: No: Alzheimer's


Cardio/Vascular: Yes: Hyperlipdemia.  No: AFIB


Pulmonary: No: COPD, Pulmonary Embolus


Gastrointestinal: No: Ascites


Hepatobiliary: No: Cirrhosis


Renal/: No: Renal Failure


Psych: No: Addictions


Musculoskeletal: Yes: Chronic low back pain


Rheumatology: No: Fibromyalgia


Endocrine: No: Diabetes Mellitus





- Past Surgical History


Past Surgical History: Yes: Tonsillectomy





- Alcohol/Substance Use


Hx Alcohol Use: Yes (RARELY)


History of Substance Use: reports: None





- Smoking History


Smoking history: Never smoked


Have you smoked in the past 12 months: No


Aproximately how many cigarettes per day: 0





- Social History


ADL: Independent


History of Recent Travel: No





Home Medications





- Allergies


Allergies/Adverse Reactions: 


                                    Allergies











Allergy/AdvReac Type Severity Reaction Status Date / Time


 


morphine AdvReac Mild  Verified 10/16/20 13:28














- Home Medications


Home Medications: 


Ambulatory Orders





Lovastatin 40 mg PO DAILY 09/03/20 


Nortriptyline HCl [Pamelor -] 50 mg PO HS 09/03/20 


propRANOLol HCL [Propranolol HCl ER] 120 mg PO HS 09/03/20 


Aspirin [Aspirin EC] 81 mg PO DAILY 10/17/20 


Fish Oil 500 mg Softgel 1 tab PO DAILY 10/17/20 


Multivit ([facility] Formulary) 1 tab PO DAILY 10/17/20 











Family Medical History


Family Hx Cardiac Disorders: Brother (MI at age 59)





Nephrology Consult





- Height


Height: 6 ft 2 in





- Weight


Weight: 239 lb





- BMI


Body Mass Index (BMI): 30.7





- Lab Results


CBC,BMP: 


                                    CBC, BMP





                                 10/17/20 06:55 





                                 10/17/20 06:55 








Anion Gap: 


                                    Anion Gap











Anion Gap  5 MMOL/L (8-16)  L  10/17/20  06:55    














- Physical Examination


Vital Signs: 


                                   Vital Signs











Temperature  97.5 F L  10/17/20 10:00


 


Pulse Rate  82   10/17/20 10:00


 


Respiratory Rate  18   10/17/20 10:00


 


Blood Pressure  119/77   10/17/20 10:00


 


O2 Sat by Pulse Oximetry (%)  97   10/17/20 10:00














Assessment/Plan





ckd


acute dvt and r/o pe


seen by pulm- no cta needed

## 2020-10-17 NOTE — PN
Physical Exam: 


SUBJECTIVE: Patient seen and examined








OBJECTIVE:





                                   Vital Signs











 Period  Temp  Pulse  Resp  BP Sys/Oconnell  Pulse Ox


 


 Last 24 Hr  98.3 F-100.1 F    16-22  106-138/60-87  











GENERAL: The patient is awake, alert, and fully oriented, in no acute distress.


NECK: Trachea midline, full range of motion, supple. 


LUNGS: Breath sounds equal, lt lower lobe inspiratory rales noted, no wheezes, 

no crackles, no accessory muscle use. 


HEART: Regular rate and rhythm, S1, S2 without murmur, rub or gallop.


ABDOMEN: Soft, nontender, nondistended, normoactive bowel sounds, no guarding, 

no rebound, no hepatosplenomegaly, no masses.


EXTREMITIES: 2+ pulses, warm, well-perfused, no edema. 


SKIN: Warm, dry, normal turgor, no rashes or lesions noted














                         Laboratory Results - last 24 hr











  10/16/20 10/16/20 10/16/20





  14:15 14:15 14:15


 


WBC  13.6 H  


 


RBC  4.89  


 


Hgb  14.8  


 


Hct  43.8  


 


MCV  89.7  


 


MCH  30.3  


 


MCHC  33.8  


 


RDW  14.1  


 


Plt Count  126 L D  


 


MPV  7.8  


 


Absolute Neuts (auto)  9.8 H  


 


Neutrophils %  72.1  D  


 


Lymphocytes %  13.3  D  


 


Monocytes %  10.8 H  


 


Eosinophils %  3.4  


 


Basophils %  0.4  


 


Nucleated RBC %  0  


 


PT with INR   


 


INR   


 


PTT (Actin FS)   


 


D-Dimer   


 


Sodium   138 


 


Potassium   4.6 


 


Chloride   106 


 


Carbon Dioxide   25 


 


Anion Gap   7 L 


 


BUN   18.6 H 


 


Creatinine   1.5 H 


 


Est GFR (CKD-EPI)AfAm   56.61 


 


Est GFR (CKD-EPI)NonAf   48.84 


 


Random Glucose   89 


 


Calcium   8.8 


 


Phosphorus   2.7 


 


Magnesium    2.4


 


Ferritin    262.4


 


Total Bilirubin   1.3 H 


 


AST   19 


 


ALT   33 


 


Alkaline Phosphatase   81 


 


LD Total    316 H


 


Creatine Kinase   61 


 


Troponin I   < 0.02 


 


C-Reactive Protein    4.7 H


 


B-Natriuretic Peptide    374.3 H


 


Total Protein   7.1 


 


Albumin   3.4 


 


Urine Color   


 


Urine Appearance   


 


Urine pH   


 


Ur Specific Gravity   


 


Urine Protein   


 


Urine Glucose (UA)   


 


Urine Ketones   


 


Urine Blood   


 


Urine Nitrite   


 


Urine Bilirubin   


 


Urine Urobilinogen   


 


Ur Leukocyte Esterase   


 


Influenza A (Rapid)   


 


Influenza B (Rapid)   


 


RSV Rapid   














  10/16/20 10/16/20 10/16/20





  14:15 14:15 17:15


 


WBC   


 


RBC   


 


Hgb   


 


Hct   


 


MCV   


 


MCH   


 


MCHC   


 


RDW   


 


Plt Count   


 


MPV   


 


Absolute Neuts (auto)   


 


Neutrophils %   


 


Lymphocytes %   


 


Monocytes %   


 


Eosinophils %   


 


Basophils %   


 


Nucleated RBC %   


 


PT with INR  13.40 H  


 


INR  1.09  


 


PTT (Actin FS)  30.3  


 


D-Dimer   95076 H 


 


Sodium   


 


Potassium   


 


Chloride   


 


Carbon Dioxide   


 


Anion Gap   


 


BUN   


 


Creatinine   


 


Est GFR (CKD-EPI)AfAm   


 


Est GFR (CKD-EPI)NonAf   


 


Random Glucose   


 


Calcium   


 


Phosphorus   


 


Magnesium   


 


Ferritin   


 


Total Bilirubin   


 


AST   


 


ALT   


 


Alkaline Phosphatase   


 


LD Total   


 


Creatine Kinase   


 


Troponin I   


 


C-Reactive Protein   


 


B-Natriuretic Peptide   


 


Total Protein   


 


Albumin   


 


Urine Color   


 


Urine Appearance   


 


Urine pH   


 


Ur Specific Gravity   


 


Urine Protein   


 


Urine Glucose (UA)   


 


Urine Ketones   


 


Urine Blood   


 


Urine Nitrite   


 


Urine Bilirubin   


 


Urine Urobilinogen   


 


Ur Leukocyte Esterase   


 


Influenza A (Rapid)    Negative


 


Influenza B (Rapid)    Negative


 


RSV Rapid   














  10/16/20 10/16/20 10/17/20





  17:15 17:53 06:55


 


WBC    14.0 H


 


RBC    4.78


 


Hgb    14.7


 


Hct    43.3


 


MCV    90.6


 


MCH    30.8


 


MCHC    33.9


 


RDW    14.1


 


Plt Count    104 L


 


MPV    8.0


 


Absolute Neuts (auto)   


 


Neutrophils %   


 


Lymphocytes %   


 


Monocytes %   


 


Eosinophils %   


 


Basophils %   


 


Nucleated RBC %   


 


PT with INR   


 


INR   


 


PTT (Actin FS)   


 


D-Dimer   


 


Sodium   


 


Potassium   


 


Chloride   


 


Carbon Dioxide   


 


Anion Gap   


 


BUN   


 


Creatinine   


 


Est GFR (CKD-EPI)AfAm   


 


Est GFR (CKD-EPI)NonAf   


 


Random Glucose   


 


Calcium   


 


Phosphorus   


 


Magnesium   


 


Ferritin   


 


Total Bilirubin   


 


AST   


 


ALT   


 


Alkaline Phosphatase   


 


LD Total   


 


Creatine Kinase   


 


Troponin I   


 


C-Reactive Protein   


 


B-Natriuretic Peptide   


 


Total Protein   


 


Albumin   


 


Urine Color   Yellow 


 


Urine Appearance   Clear 


 


Urine pH   7.0 


 


Ur Specific [place]   1.011 


 


Urine Protein   Negative 


 


Urine Glucose (UA)   Negative 


 


Urine Ketones   Negative 


 


Urine Blood   Negative 


 


Urine Nitrite   Negative 


 


Urine Bilirubin   Negative 


 


Urine Urobilinogen   0.2 


 


Ur Leukocyte Esterase   Negative 


 


Influenza A (Rapid)   


 


Influenza B (Rapid)   


 


RSV Rapid  Negative  














  10/17/20





  06:55


 


WBC 


 


RBC 


 


Hgb 


 


Hct 


 


MCV 


 


MCH 


 


MCHC 


 


RDW 


 


Plt Count 


 


MPV 


 


Absolute Neuts (auto) 


 


Neutrophils % 


 


Lymphocytes % 


 


Monocytes % 


 


Eosinophils % 


 


Basophils % 


 


Nucleated RBC % 


 


PT with INR 


 


INR 


 


PTT (Actin FS) 


 


D-Dimer 


 


Sodium  139


 


Potassium  4.7


 


Chloride  106


 


Carbon Dioxide  28


 


Anion Gap  5 L


 


BUN  17.7


 


Creatinine  1.5 H


 


Est GFR (CKD-EPI)AfAm  56.61


 


Est GFR (CKD-EPI)NonAf  48.84


 


Random Glucose  111 H


 


Calcium  8.7


 


Phosphorus  2.5


 


Magnesium  2.3


 


Ferritin 


 


Total Bilirubin  1.5 H


 


AST  18


 


ALT  31


 


Alkaline Phosphatase  72


 


LD Total 


 


Creatine Kinase 


 


Troponin I 


 


C-Reactive Protein 


 


B-Natriuretic Peptide 


 


Total Protein  6.9


 


Albumin  3.2 L


 


Urine Color 


 


Urine Appearance 


 


Urine pH 


 


Ur Specific Gravity 


 


Urine Protein 


 


Urine Glucose (UA) 


 


Urine Ketones 


 


Urine Blood 


 


Urine Nitrite 


 


Urine Bilirubin 


 


Urine Urobilinogen 


 


Ur Leukocyte Esterase 


 


Influenza A (Rapid) 


 


Influenza B (Rapid) 


 


RSV Rapid 








Active Medications











Generic Name Dose Route Start Last Admin





  Trade Name Tom  PRN Reason Stop Dose Admin


 


Atorvastatin Calcium  10 mg  10/16/20 22:00  10/16/20 22:52





  Lipitor -  PO   10 mg





  [facility]   Administration


 


Enoxaparin Sodium  100 mg  10/16/20 22:00  10/16/20 21:46





  Lovenox -  SQ   Not Given





  [facility]  


 


Influenza Virus Vaccine  60 mcg  10/17/20 10:00 





  Flulaval Quad [id number] Syr  IM  10/17/20 10:01 





  .ONCE ONE  


 


Nortriptyline HCl  50 mg  10/17/20 22:00 





  Pamelor -  PO  





  [facility]  


 


Propranolol HCl  120 mg  10/16/20 22:00  10/16/20 22:39





  Inderal La -  PO   120 mg





  [facility]   Administration











ASSESSMENT/PLAN:


65 year old gentleman with history of hyperlipidemia, migraines, obesity, CKD( 

single kidney s/p donation) and recent sciatica presents with complaint of 

shortness of breath that started while lifting his grandson, he also endorses 

recent sciatica in sept. 2020. 





# Dyspnea:


-Acute DVT to rule out PE, to rule out ACS


-chest pain on deep inspiration


-CT chest noted. Pt reports that he's aware of lung nodule and that it was 

discovered over 20 years ago


-lifetime non smoker, occassional EtOH, no IVDU


-denies sick contact, no fever, chills, no loss of sense of smell or taste


-COVID test pending


-US doppler found new lt DVT.


-on full dose Enoxaparin


-DVT likely provoked (recent sciatica, decrease activity)


-opted for V/Q scan to avoid contrast despite low risk, discussed with 

nephrology


-V/Q scan showed low probability for PE


-pending pulmonary consult


-hemodynamically stable


-WBC trended up, single episode of fever overnight noted


-can't rule out ACS given his presentation


-troponin negative, no typical chest pain, EKG showed LBBB which present on 

previous EKG


-will repeat EKG and trop


-cardiology consult 





HLD


Obesity


Migraine


CKD


sciatica





DVT prophylaxis on full dose AC





Visit type





- Emergency Visit


Emergency Visit: Yes


ED Registration Date: 10/16/20


Care time: The patient presented to the Emergency Department on the above date 

and was hospitalized for further evaluation of their emergent condition.





- New Patient


This patient is new to me today: Yes


Date on this admission: 10/17/20





- Critical Care


Critical Care patient: No





- Discharge Referral


Referred to [facility] No

## 2020-10-17 NOTE — CON.PULM
Consult


Consult Specialty:: PULMONARY


Referred by:: ANTONIA


Reason for Consultation:: DVT/SOB





- History of Present Illness


Chief Complaint: SOB/DVT


History of Present Illness: 








65M w/hx HLD, solitary kidney 2/2 donation . Doesn't know his baseline CR.  H/o 

migraines reports two days of worsening dyspnea, orthopnea. He is a lifelong n

onsmoker and works as a  at [employer]. He reports that he has needed 

to prop himself up to sleep at night, and that his sob worsens while laying 

flat. He reports presenting to Urgent Care today for his worsening shortness of 

breath and dyspnea, and was found to have O2% to 96% on room air that dropped to

90% with ambulation. He denies any lower extremity swelling, fevers, chills, 

cough, weakness, confusion, chest pain, or palpitations. Patient has a positive 

lower duplex for dvt and has been started on Lovenox. He has been sedentary due 

to back pain from a sprain while trying to transplant a tree. 








- History Source


History Provided By: Patient, Medical Record


Limitations to Obtaining History: No Limitations





- Past Medical History


CNS: No: Alzheimer's


Cardio/Vascular: Yes: Hyperlipdemia.  No: AFIB


Pulmonary: No: COPD, Pulmonary Embolus


Gastrointestinal: No: Ascites


Hepatobiliary: No: Cirrhosis


Renal/: No: Renal Failure


Heme/Onc: No: Anemia


Psych: No: Addictions


Musculoskeletal: Yes: Chronic low back pain


Rheumatology: No: Fibromyalgia


Endocrine: No: Diabetes Mellitus





- Past Surgical History


Past Surgical History: Yes: Tonsillectomy





- Alcohol/Substance Use


Hx Alcohol Use: Yes (RARELY)


History of Substance Use: reports: None





- Smoking History


Smoking history: Never smoked


Have you smoked in the past 12 months: No


Aproximately how many cigarettes per day: 0





- Social History


ADL: Independent


Place of Birth: [place]


History of Recent Travel: No





Home Medications





- Allergies


Allergies/Adverse Reactions: 


                                    Allergies











Allergy/AdvReac Type Severity Reaction Status Date / Time


 


morphine AdvReac Mild  Verified 10/16/20 13:28














- Home Medications


Home Medications: 


Ambulatory Orders





Lovastatin 40 mg PO DAILY 09/03/20 


Nortriptyline HCl [Pamelor -] 50 mg PO HS 09/03/20 


propRANOLol HCL [Propranolol HCl ER] 120 mg PO HS 09/03/20 


Aspirin [Aspirin EC] 81 mg PO DAILY 10/17/20 


Fish Oil 500 mg Softgel 1 tab PO DAILY 10/17/20 


Multivit ([facility] Formulary) 1 tab PO DAILY 10/17/20 











Family Medical History


Family Hx Cardiac Disorders: Brother (MI at age 59)





Review of Systems





- Review of Systems


Constitutional: denies: Fever


Eyes: denies: Blurred Vision


HENT: denies: Difficult Swallowing


Neck: denies: Decreased ROM


Cardiovascular: denies: Chest Pain


Respiratory: reports: Exercise Intolerance, SOB, SOB on Exertion.  denies: 

Cough, Hemoptysis, Wheezing


Gastrointestinal: denies: Abdominal Pain


Genitourinary: denies: Burning


Breasts: reports: No Symptoms Reported


Musculoskeletal: reports: Back Pain


Integumentary: reports: No Symptoms


Neurological: reports: No Symptoms


Endocrine: reports: No Symptoms


Hematology/Lymphatic: reports: No Symptoms


Psychiatric: reports: No Symptoms





Physical Exam


Vital Sings: 


                                   Vital Signs











Temperature  98.3 F   10/17/20 04:06


 


Pulse Rate  92 H  10/17/20 04:06


 


Respiratory Rate  20   10/17/20 04:06


 


Blood Pressure  119/66   10/17/20 04:06


 


O2 Sat by Pulse Oximetry (%)  98   10/17/20 04:13











Constitutional: Yes: Calm


Eyes: Yes: EOM Intact


HENT: Yes: Normocephalic


Neck: Yes: Trachea Midline


Cardiovascular: Yes: Regular Rate and Rhythm, S1, S2


Respiratory: Yes: CTA Bilaterally


Gastrointestinal: Yes: Soft, Abdomen, Obese


Extremities: No: Calf Tenderness


Edema: No


Neurological: Yes: Alert


Labs: 


                                    CBC, BMP





                                 10/17/20 06:55 





                                 10/17/20 06:55 





rest reviewed





Imaging





- Results


Chest X-ray: Report Reviewed, Image Reviewed


Cat Scan: Report Reviewed, Image Reviewed


Ultrasound: Report Reviewed


EKG: Report Reviewed





Problem List





- Problems


(1) DVT (deep venous thrombosis)


Code(s): I82.409 - ACUTE EMBOLISM AND THOMBOS UNSP DEEP VN UNSP LOWER EXTREMITY 

 





(2) Suspected pulmonary embolism


Code(s): R09.89 - OTH SYMPTOMS AND SIGNS INVOLVING THE CIRC AND RESP SYSTEMS   





(3) SOB (shortness of breath) on exertion


Code(s): R06.02 - SHORTNESS OF BREATH   





(4) Chest pain


Code(s): R07.9 - CHEST PAIN, UNSPECIFIED   





(5) Acquired solitary kidney


Code(s): Z90.5 - ACQUIRED ABSENCE OF KIDNEY   





Assessment/Plan





ACUTE DVT LIKELY PROVOKED FROM SEDENTARY STATE DUE TO BACK STRAIN


SOLITARY KIDNEY/CR 1.5 (LIKELY BASELINE) PRECLUDES CTA, WOULDN'T CHANGE 

TREATMENT DECISIONS


CAN SWITCH TO DOAC AFTER LAST DOSE OF LOVENOX ADMINISTERED


WOULD CONTINUE FOR 3 MONTH POST DISCHARGE





DANNA LAZCANO MD

## 2020-10-17 NOTE — EKG
Test Reason : 

Blood Pressure : ***/*** mmHG

Vent. Rate : 086 BPM     Atrial Rate : 086 BPM

   P-R Int : 228 ms          QRS Dur : 144 ms

    QT Int : 390 ms       P-R-T Axes : 058 013 086 degrees

   QTc Int : 466 ms

 

SINUS RHYTHM WITH 1ST DEGREE A-V BLOCK

LEFT BUNDLE BRANCH BLOCK

ABNORMAL ECG

WHEN COMPARED WITH ECG OF 03-SEP-2020 00:55,

T WAVE INVERSION NO LONGER EVIDENT IN LATERAL LEADS

Confirmed by MD EV, KATLYN ([id number]) on 10/17/2020 6:40:56 PM

 

Referred By:             Confirmed By:KATLYN CARRENO MD

## 2020-10-18 LAB
DEPRECATED RDW RBC AUTO: 14.1 % (ref 11.9–15.9)
HCT VFR BLD CALC: 42.5 % (ref 35.4–49)
HGB BLD-MCNC: 14.4 GM/DL (ref 11.7–16.9)
MCH RBC QN AUTO: 31.1 PG (ref 25.7–33.7)
MCHC RBC AUTO-ENTMCNC: 34 G/DL (ref 32–35.9)
MCV RBC: 91.4 FL (ref 80–96)
PLATELET # BLD AUTO: 98 K/MM3 (ref 134–434)
PMV BLD: 8.1 FL (ref 7.5–11.1)
RBC # BLD AUTO: 4.65 M/MM3 (ref 4–5.6)
WBC # BLD AUTO: 14 K/MM3 (ref 4–10)

## 2020-10-18 RX ADMIN — ATORVASTATIN CALCIUM SCH MG: 10 TABLET, FILM COATED ORAL at 22:17

## 2020-10-18 RX ADMIN — ENOXAPARIN SODIUM SCH MG: 100 INJECTION SUBCUTANEOUS at 22:16

## 2020-10-18 RX ADMIN — NORTRIPTYLINE HYDROCHLORIDE SCH MG: 25 CAPSULE ORAL at 22:16

## 2020-10-18 RX ADMIN — CEFTRIAXONE SCH MLS/HR: 1 INJECTION, POWDER, FOR SOLUTION INTRAMUSCULAR; INTRAVENOUS at 09:08

## 2020-10-18 RX ADMIN — ENOXAPARIN SODIUM SCH MG: 100 INJECTION SUBCUTANEOUS at 09:07

## 2020-10-18 RX ADMIN — AZITHROMYCIN SCH MG: 250 TABLET, FILM COATED ORAL at 09:08

## 2020-10-18 RX ADMIN — ACETAMINOPHEN PRN MG: 325 TABLET ORAL at 18:41

## 2020-10-18 RX ADMIN — FUROSEMIDE SCH MG: 20 TABLET ORAL at 09:07

## 2020-10-18 NOTE — PN
Physical Exam: 


SUBJECTIVE: 


Patient seen and examined.








OBJECTIVE:





                                   Vital Signs











 Period  Temp  Pulse  Resp  BP Sys/Oconnell  Pulse Ox


 


 Last 24 Hr  98 F-99.3 F  68-91  17-20  110-129/48-71  92-99











GENERAL: The patient is awake, alert, and fully oriented, in no acute distress.


NECK: Trachea midline, full range of motion, supple. 


LUNGS: Breath sounds equal, lt lower lobe inspiratory rales noted, no wheezes, 

no crackles, no accessory muscle use. 


HEART: Regular rate and rhythm, S1, S2 without murmur, rub or gallop.


ABDOMEN: Soft, nontender, nondistended, normoactive bowel sounds, no guarding, 

no rebound, no hepatosplenomegaly, no masses.


EXTREMITIES: 2+ pulses, warm, well-perfused, no edema. 


SKIN: Warm, dry, normal turgor, no rashes or lesions noted














                         Laboratory Results - last 24 hr











  10/18/20 10/18/20 10/18/20





  05:30 05:30 05:30


 


WBC  14.0 H  


 


RBC  4.65  


 


Hgb  14.4  


 


Hct  42.5  


 


MCV  91.4  


 


MCH  31.1  


 


MCHC  34.0  


 


RDW  14.1  


 


Plt Count  98 L  


 


MPV  8.1  


 


PTT (Actin FS)   38.9 H 


 


Troponin I    < 0.02








Active Medications











Generic Name Dose Route Start Last Admin





  Trade Name Freq  PRN Reason Stop Dose Admin


 


Atorvastatin Calcium  10 mg  10/16/20 22:00  10/17/20 21:22





  Lipitor -  PO   10 mg





  HS CORDELL   Administration


 


Azithromycin  500 mg  10/18/20 10:00  10/18/20 09:08





  Zithromax -  PO   500 mg





  DAILY CORDELL   Administration


 


Enoxaparin Sodium  100 mg  10/16/20 22:00  10/18/20 09:07





  Lovenox -  SQ   100 mg





  BID CORDELL   Administration


 


Furosemide  20 mg  10/17/20 15:45  10/18/20 09:07





  Lasix -  PO   20 mg





  DAILY CORDELL   Administration


 


Ceftriaxone Sodium 1 gm/  50 mls @ 100 mls/hr  10/18/20 10:00  10/18/20 09:08





  Dextrose  IVPB   100 mls/hr





  DAILY CORDELL   Administration


 


Nortriptyline HCl  50 mg  10/17/20 22:00  10/17/20 21:22





  Pamelor -  PO   50 mg





  HS CORDELL   Administration


 


Propranolol HCl  120 mg  10/16/20 22:00  10/17/20 21:22





  Inderal La -  PO   120 mg





  HS CORDELL   Administration











ASSESSMENT/PLAN:


Pt is a 66 yo M with PMHx of HLD, migraines, obesity, CKD (single kidney s/p 

donation) recent sciatica presents with complaint of shortness of breath that 

started while lifting his grandson, admitted for dyspnea and DVT.





#Dyspnea with L sided chest pain on deep inspiration/sneezing


-still present, pleuritic chest pain


-CP on deep inspiration


-no fever, chills, afebrile, normal rate, no cough


-COVID negative


-V/Q scan -- low probability PE


-started on Rocephin and Azithro, leukocytosis present; possibly pneumonia?


-EKG unremarkable, trops negative x2


-pulm and cardio consults appreciated





#L DVT, provoked


-Doppler U/S showing L DVT


-on 100mg BID Lovenox, therapeutic dose


-switch to PO Eliquis in AM, per pulm continue PO for 3 months


-hemodynamically stable





#Lung nodule


-reported to be stable, will need f/u CT/comparison to old images with PCP


-f/u outpatient





#Hx HLD


-continue lipitor





#Obesity


-counseled on weight loss, exercise, and eating habits





#Migraines


-on sumatriptan at home, held currently





#CKD


-VQ scan done instead of CTA due to CKD





FEN


Monitor lytes


Chol/fat/sodium restricted diet


No standing fluids





PPx


Lovenox 100mg BID for DVT





Dispo


Monitor on tele. 





Visit type





- Emergency Visit


Emergency Visit: Yes


ED Registration Date: 10/16/20


Care time: The patient presented to the Emergency Department on the above date 

and was hospitalized for further evaluation of their emergent condition.





- New Patient


This patient is new to me today: No





- Critical Care


Critical Care patient: No





ATTENDING PHYSICIAN STATEMENT





I saw and evaluated the patient.


I reviewed the resident's note and discussed the case with the resident.


I agree with the resident's findings and plan as documented.








SUBJECTIVE:








OBJECTIVE:








ASSESSMENT AND PLAN:

## 2020-10-18 NOTE — PN
Progress Note (short form)





- Note


Progress Note: 











ckd


solitary kidney 2/2 donation





acute dvt and r/o pe


seen by pulm- no cta needed 








Active Medications





Acetaminophen (Tylenol -)  650 mg PO Q6H PRN


   PRN Reason: PAIN LEVEL 1-5


   Last Admin: 10/18/20 18:41 Dose:  650 mg


   Documented by: 


Atorvastatin Calcium (Lipitor -)  10 mg PO HS [facility]


   Last Admin: 10/17/20 21:22 Dose:  10 mg


   Documented by: 


Azithromycin (Zithromax -)  500 mg PO DAILY [facility]


   Last Admin: 10/18/20 09:08 Dose:  500 mg


   Documented by: 


Enoxaparin Sodium (Lovenox -)  100 mg SQ BID [facility]


   Last Admin: 10/18/20 09:07 Dose:  100 mg


   Documented by: 


Furosemide (Lasix -)  20 mg PO DAILY [facility]


   Last Admin: 10/18/20 09:07 Dose:  20 mg


   Documented by: 


Ceftriaxone Sodium 1 gm/ (Dextrose)  50 mls @ 100 mls/hr IVPB DAILY [facility]


   Last Admin: 10/18/20 09:08 Dose:  100 mls/hr


   Documented by: 


Nortriptyline HCl (Pamelor -)  50 mg PO HS [facility]


   Last Admin: 10/17/20 21:22 Dose:  50 mg


   Documented by: 


Propranolol HCl (Inderal La -)  120 mg PO HS [facility]


   Last Admin: 10/17/20 21:22 Dose:  120 mg


   Documented by: 





                                Last Vital Signs











Temp Pulse Resp BP Pulse Ox


 


 98.7 F   84   18   122/66   98 


 


 10/18/20 18:00  10/18/20 18:00  10/18/20 18:00  10/18/20 18:00  10/18/20 18:00














                                    CBC, BMP





                                 10/18/20 05:30 





                                 10/17/20 06:55 





ckd


solitary kidney 2/2 donation


renal function stable





acute dvt and r/o pe


seen by pulm- no cta needed








Plan- continue to monitor renal function

## 2020-10-18 NOTE — EKG
Test Reason : 

Blood Pressure : ***/*** mmHG

Vent. Rate : 086 BPM     Atrial Rate : 086 BPM

   P-R Int : 230 ms          QRS Dur : 148 ms

    QT Int : 392 ms       P-R-T Axes : 048 002 040 degrees

   QTc Int : 469 ms

 

SINUS RHYTHM WITH 1ST DEGREE A-V BLOCK

LEFT BUNDLE BRANCH BLOCK

ABNORMAL ECG

WHEN COMPARED WITH ECG OF 16-OCT-2020 13:51,

NO SIGNIFICANT CHANGE WAS FOUND

Confirmed by MD EV, KATLYN ([id number]) on 10/18/2020 2:44:27 PM

 

Referred By:             Confirmed By:KATLYN CARRENO MD

## 2020-10-18 NOTE — PN
Progress Note, Physician


History of Present Illness: 





65M w/hx HLD, migraines p/w two days of worsening dyspnea, orthopnea. He reports

that two days ago he realized that he was increasingly short of breath with 

exertion while walking around the house. He reports dutifully remaining in 

quarantine and maintaining safe social distancing, except for occasionally 

watching his 5yo granddaughter. He reports that he has needed to prop himself up

to sleep at night, and that his sob worsens while laying flat. He reports 

presenting to Urgent Care today for his worsening shortness of breath and 

dyspnea, and was found to have O2% to 96% on room air that dropped to 90% with 

ambulation. He denies any lower extremity swelling, fevers, chills, cough, we

akness, confusion, chest pain, or palpitations. 








- Current Medication List


Current Medications: 


Active Medications





Atorvastatin Calcium (Lipitor -)  10 mg PO [facility]


   Last Admin: 10/17/20 21:22 Dose:  10 mg


   Documented by: 


Azithromycin (Zithromax -)  500 mg PO DAILY [facility]


   Last Admin: 10/18/20 09:08 Dose:  500 mg


   Documented by: 


Enoxaparin Sodium (Lovenox -)  100 mg SQ BID [facility]


   Last Admin: 10/18/20 09:07 Dose:  100 mg


   Documented by: 


Furosemide (Lasix -)  20 mg PO DAILY [facility]


   Last Admin: 10/18/20 09:07 Dose:  20 mg


   Documented by: 


Ceftriaxone Sodium 1 gm/ (Dextrose)  50 mls @ 100 mls/hr IVPB DAILY [facility]


   Last Admin: 10/18/20 09:08 Dose:  100 mls/hr


   Documented by: 


Nortriptyline HCl (Pamelor -)  50 mg PO [facility]


   Last Admin: 10/17/20 21:22 Dose:  50 mg


   Documented by: 


Propranolol HCl (Inderal La -)  120 mg PO [facility]


   Last Admin: 10/17/20 21:22 Dose:  120 mg


   Documented by: 











- Objective


Vital Signs: 


                                   Vital Signs











Temperature  98.1 F   10/18/20 14:00


 


Pulse Rate  68   10/18/20 14:00


 


Respiratory Rate  18   10/18/20 14:00


 


Blood Pressure  129/71   10/18/20 14:00


 


O2 Sat by Pulse Oximetry (%)  98   10/18/20 14:00











Eyes: Yes: WNL, Conjunctiva Clear, EOM Intact


HENT: Yes: WNL, Atraumatic, Normocephalic


Neck: Yes: WNL, Supple, Trachea Midline


Cardiovascular: Yes: WNL, Regular Rate and Rhythm


Respiratory: Yes: WNL, Regular, CTA Bilaterally


Gastrointestinal: Yes: WNL, Normal Bowel Sounds


Genitourinary: Yes: WNL


Musculoskeletal: Yes: WNL


Extremities: Yes: WNL


Edema: No


Integumentary: Yes: WNL


Neurological: Yes: WNL, Alert, Oriented


...Motor Strength: WNL


Psychiatric: Yes: WNL


Labs: 


                                    CBC, BMP





                                 10/18/20 05:30 





                                 10/17/20 06:55 





                                    INR, PTT











INR  1.09  (0.83-1.09)   10/16/20  14:15    














Problem List





- Problems


(1) Acquired solitary kidney


Code(s): Z90.5 - ACQUIRED ABSENCE OF KIDNEY   





(2) DVT (deep venous thrombosis)


Code(s): I82.409 - ACUTE EMBOLISM AND THOMBOS UNSP DEEP VN UNSP LOWER EXTREMITY 

 





(3) SOB (shortness of breath) on exertion


Code(s): R06.02 - SHORTNESS OF BREATH   





(4) Suspected pulmonary embolism


Code(s): R09.89 - OTH SYMPTOMS AND SIGNS INVOLVING THE CIRC AND RESP SYSTEMS   





(5) Chest pain


Code(s): R07.9 - CHEST PAIN, UNSPECIFIED   





Assessment/Plan





Imp;


Acute DVT


LBBB


CHF decompensated


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


CHF


Neg MIBI ST Sept. 2020


V/Q scan negative for PE


ECHO nl ef in Sept 2020 - BNP nl at that time, Cr. unchanged at 1.5








Plan;


AC Lovenox than DOAC


Telemetry


serial EKGs and TNIs


Low dose of PO Lasix - monitor BUN/Cr


ECHO

## 2020-10-18 NOTE — EKG
Test Reason : 

Blood Pressure : ***/*** mmHG

Vent. Rate : 077 BPM     Atrial Rate : 077 BPM

   P-R Int : 236 ms          QRS Dur : 112 ms

    QT Int : 402 ms       P-R-T Axes : 052 014 045 degrees

   QTc Int : 454 ms

 

SINUS RHYTHM WITH 1ST DEGREE A-V BLOCK

OTHERWISE NORMAL ECG

WHEN COMPARED WITH ECG OF 17-OCT-2020 16:52,

LEFT BUNDLE BRANCH BLOCK IS NO LONGER PRESENT

Confirmed by MD EV, KATLYN ([id number]) on 10/18/2020 2:41:51 PM

 

Referred By:  ROSHNI DEWEY           Confirmed By:KATLYN CARRENO MD

## 2020-10-18 NOTE — PN
Progress Note (short form)





- Note


Progress Note: 





PULMONARY





APPEARS FATIGUED





VSS/AFEBRILE 94%SPO2 ON R/A





PALE/ANICTERIC


DIMINISHED BREATH SOUNDS


S1S2


OBESE


NO EDEMA





LABS/MEDS/NOTES/IMAGES REVIEWED





V/Q LOW PROB PE


      PATCHY B/L PERFUSION/VENTILATION UPTAKE(DENOTES UNDERLYING LUNG DISEASE) 

HOWEVER,CT CHEST DOESN'T SUPPORT.





CONTINUE A/C FOR DVT


CAN TRANSITION TO DOAC


ANTIBIOTICS BEGUN BY PRIMARY








R NENO MURPHY


 





Problem List





- Problems


(1) DVT (deep venous thrombosis)


Code(s): I82.409 - ACUTE EMBOLISM AND THOMBOS UNSP DEEP VN UNSP LOWER EXTREMITY 

 





(2) Suspected pulmonary embolism


Code(s): R09.89 - OTH SYMPTOMS AND SIGNS INVOLVING THE CIRC AND RESP SYSTEMS   





(3) SOB (shortness of breath) on exertion


Code(s): R06.02 - SHORTNESS OF BREATH   





(4) Chest pain


Code(s): R07.9 - CHEST PAIN, UNSPECIFIED   





(5) Acquired solitary kidney


Code(s): Z90.5 - ACQUIRED ABSENCE OF KIDNEY

## 2020-10-19 LAB
ANION GAP SERPL CALC-SCNC: 7 MMOL/L (ref 8–16)
BASOPHILS # BLD: 0.5 % (ref 0–2)
BUN SERPL-MCNC: 18 MG/DL (ref 7–18)
CALCIUM SERPL-MCNC: 8.6 MG/DL (ref 8.5–10.1)
CHLORIDE SERPL-SCNC: 106 MMOL/L (ref 98–107)
CO2 SERPL-SCNC: 26 MMOL/L (ref 21–32)
CREAT SERPL-MCNC: 1.2 MG/DL (ref 0.55–1.3)
DEPRECATED RDW RBC AUTO: 14.1 % (ref 11.9–15.9)
EOSINOPHIL # BLD: 4.1 % (ref 0–4.5)
GLUCOSE SERPL-MCNC: 97 MG/DL (ref 74–106)
HCT VFR BLD CALC: 41.2 % (ref 35.4–49)
HGB BLD-MCNC: 14 GM/DL (ref 11.7–16.9)
LYMPHOCYTES # BLD: 17.6 % (ref 8–40)
MAGNESIUM SERPL-MCNC: 2.4 MG/DL (ref 1.8–2.4)
MCH RBC QN AUTO: 31 PG (ref 25.7–33.7)
MCHC RBC AUTO-ENTMCNC: 34 G/DL (ref 32–35.9)
MCV RBC: 91 FL (ref 80–96)
MONOCYTES # BLD AUTO: 8 % (ref 3.8–10.2)
NEUTROPHILS # BLD: 69.8 % (ref 42.8–82.8)
PHOSPHATE SERPL-MCNC: 2.4 MG/DL (ref 2.5–4.9)
PLATELET # BLD AUTO: 101 K/MM3 (ref 134–434)
PMV BLD: 8.2 FL (ref 7.5–11.1)
POTASSIUM SERPLBLD-SCNC: 4.2 MMOL/L (ref 3.5–5.1)
RBC # BLD AUTO: 4.52 M/MM3 (ref 4–5.6)
SODIUM SERPL-SCNC: 138 MMOL/L (ref 136–145)
WBC # BLD AUTO: 11.8 K/MM3 (ref 4–10)

## 2020-10-19 RX ADMIN — ACETAMINOPHEN PRN MG: 325 TABLET ORAL at 21:10

## 2020-10-19 RX ADMIN — ACETAMINOPHEN PRN MG: 325 TABLET ORAL at 03:22

## 2020-10-19 RX ADMIN — CEFTRIAXONE SCH MLS/HR: 1 INJECTION, POWDER, FOR SOLUTION INTRAMUSCULAR; INTRAVENOUS at 10:51

## 2020-10-19 RX ADMIN — APIXABAN SCH MG: 5 TABLET, FILM COATED ORAL at 21:10

## 2020-10-19 RX ADMIN — ATORVASTATIN CALCIUM SCH MG: 10 TABLET, FILM COATED ORAL at 21:10

## 2020-10-19 RX ADMIN — APIXABAN SCH MG: 5 TABLET, FILM COATED ORAL at 10:51

## 2020-10-19 RX ADMIN — NORTRIPTYLINE HYDROCHLORIDE SCH MG: 25 CAPSULE ORAL at 21:10

## 2020-10-19 RX ADMIN — AZITHROMYCIN SCH MG: 250 TABLET, FILM COATED ORAL at 10:51

## 2020-10-19 RX ADMIN — FUROSEMIDE SCH MG: 20 TABLET ORAL at 19:01

## 2020-10-19 NOTE — ECHO
______________________________________________________________________________



Name: LUIS FERNANDO HARTLEY                                     Exam:Adult Echocardiogram

MRN: [id number]         Study Date: 10/19/2020 09:25 AM

Age: 65 yrs

______________________________________________________________________________



Reason For Study: dvt w/PE r/o r heart Strain

Height: 74 in        Weight: 240 lb        BSA: 2.3 m2



______________________________________________________________________________



MMode/2D Measurements & Calculations

IVSd: 1.2 cm                                       Ao root diam: 3.3 cm

LVIDd: 3.1 cm                                      LA dimension: 3.1 cm

LVIDs: 2.3 cm                                      ACS: 2.0 cm

LVPWd: 1.1 cm



____________________________________________________

LVPWs: 1.9 cm                                      EDV(Teich): 36.9 ml

                                                   ESV(Jonah): 17.5 ml



____________________________________________________

LVOT diam: 2.1 cm                                  RV S Star: 9.2 cm/sec



Doppler Measurements & Calculations

MV E max star: 42.0 cm/sec                                    Ao V2 max: 89.0 cm/sec

MV A max star: 67.6 cm/sec                                    Ao max PG: 3.2 mmHg

MV E/A: 0.62

MV dec time: 0.17 sec                                        CHUY(V,D): 2.9 cm2



______________________________________________________________

LV V1 max P.1 mmHg                                       PA V2 max: 53.9 cm/sec

LV V1 max: 72.6 cm/sec                                       PA max P.6 mmHg



______________________________________________________________

Med Peak E' Star: 4.6 cm/sec

Med E/e': 9.2

Lat Peak E' Star: 5.1 cm/sec

Lat E/e': 8.3





______________________________________________________________________________

Procedure

A complete two-dimensional transthoracic echocardiogram was performed (2D, M-mode, Doppler and color 
flow

Doppler). Technically limited study.

Left Ventricle

The left ventricle is normal in size. There is mild concentric left ventricular hypertrophy. Left donald
tricular

systolic function is mildly reduced. Ejection Fraction = 45-50%. Grade I diastolic dysfunction, (abno
rmal

relaxation pattern). Ratio E/E'= 9. There is mild global hypokinesis of the left ventricle.

Right Ventricle

The right ventricle is not well visualized. There appears to be no RV strain. RV systolic function ap
pears to

be preserved.

Atria

The left atrial size is normal. Right atrial size is normal.

Mitral Valve

The mitral valve is normal in structure and function. There is no mitral regurgitation noted.

Tricuspid Valve

The tricuspid valve is not well visualized.

Aortic Valve

There is mild aortic sclerosis.;. No aortic regurgitation is present.

Pulmonic Valve

The pulmonic valve is not well visualized.

Great Vessels

The aortic root is normal size.

Pericardium/Pleura

There is no pericardial effusion.

______________________________________________________________________________





Interpretation Summary

Technically limited study

The left ventricle is normal in size.

There is mild concentric left ventricular hypertrophy.

Left ventricular systolic function is mildly reduced.

There is mild global hypokinesis of the left ventricle.

Ejection Fraction = 45-50%.

Grade I diastolic dysfunction, (abnormal relaxation pattern).

Ratio E/E'= 9

The right ventricle is not well visualized.

There appears to be no RV strain. RV systolic function appears to be preserved.

The left atrial size is normal.

Right atrial size is normal.

There is mild aortic sclerosis.

no significant valvular regurgitations are seen

There is no pericardial effusion.





Pietro Trejo MD 10/19/2020 11:33 AM

## 2020-10-19 NOTE — PN
Progress Note, Physician


History of Present Illness: 





Pt seen and examined at bedside. He is awake and alert. He denies shortness of 

breath at rest. 





- Current Medication List


Current Medications: 


Active Medications





Acetaminophen (Tylenol -)  650 mg PO Q6H PRN


   PRN Reason: PAIN LEVEL 1-5


   Last Admin: 10/19/20 03:22 Dose:  650 mg


   Documented by: 


Apixaban (Eliquis -)  5 mg PO BID [facility]


   Last Admin: 10/19/20 10:51 Dose:  5 mg


   Documented by: 


Atorvastatin Calcium (Lipitor -)  10 mg PO HS [facility]


   Last Admin: 10/18/20 22:17 Dose:  10 mg


   Documented by: 


Azithromycin (Zithromax -)  500 mg PO DAILY [facility]


   Last Admin: 10/19/20 10:51 Dose:  500 mg


   Documented by: 


Ceftriaxone Sodium 1 gm/ (Dextrose)  50 mls @ 100 mls/hr IVPB DAILY [facility]


   Last Admin: 10/19/20 10:51 Dose:  100 mls/hr


   Documented by: 


Nortriptyline HCl (Pamelor -)  50 mg PO [facility]


   Last Admin: 10/18/20 22:16 Dose:  50 mg


   Documented by: 


Propranolol HCl (Inderal La -)  120 mg PO [facility]


   Last Admin: 10/18/20 22:16 Dose:  120 mg


   Documented by: 











- Objective


Vital Signs: 


                                   Vital Signs











Temperature  97.9 F   10/19/20 13:48


 


Pulse Rate  78   10/19/20 13:48


 


Respiratory Rate  18   10/19/20 13:48


 


Blood Pressure  128/67   10/19/20 13:48


 


O2 Sat by Pulse Oximetry (%)  97   10/19/20 10:00











Constitutional: Yes: Calm


Eyes: Yes: Conjunctiva Clear


HENT: Yes: Atraumatic


Neck: Yes: Supple


Cardiovascular: Yes: S1, S2


Respiratory: Yes: CTA Bilaterally


Gastrointestinal: Yes: Soft, Abdomen, Obese


Genitourinary: Yes: WNL


Musculoskeletal: Yes: WNL


Edema: No


Neurological: Yes: Oriented


Psychiatric: Yes: Oriented


Labs: 


                                    CBC, BMP





                                 10/19/20 07:08 





                                 10/19/20 07:08 





                                    INR, PTT











INR  1.09  (0.83-1.09)   10/16/20  14:15    














Problem List





- Problems


(1) Acquired solitary kidney


Code(s): Z90.5 - ACQUIRED ABSENCE OF KIDNEY   





(2) Suspected pulmonary embolism


Code(s): R09.89 - OTH SYMPTOMS AND SIGNS INVOLVING THE CIRC AND RESP SYSTEMS   





Assessment/Plan





                               Current Medications











Generic Name Dose Route Start Last Admin





  Trade Name Tom  PRN Reason Stop Dose Admin


 


Acetaminophen  650 mg  10/18/20 18:27  10/19/20 03:22





  Tylenol -  PO   650 mg





  Q6H PRN   Administration





  PAIN LEVEL 1-5  


 


Apixaban  5 mg  10/19/20 10:00  10/19/20 10:51





  Eliquis -  PO   5 mg





  BID CORDELL   Administration


 


Atorvastatin Calcium  10 mg  10/16/20 22:00  10/18/20 22:17





  Lipitor -  PO   10 mg





  HS CORDELL   Administration


 


Azithromycin  500 mg  10/18/20 10:00  10/19/20 10:51





  Zithromax -  PO   500 mg





  DAILY CORDELL   Administration


 


Ceftriaxone Sodium 1 gm/  50 mls @ 100 mls/hr  10/18/20 10:00  10/19/20 10:51





  Dextrose  IVPB   100 mls/hr





  DAILY CORDELL   Administration


 


Nortriptyline HCl  50 mg  10/17/20 22:00  10/18/20 22:16





  Pamelor -  PO   50 mg





  HS CORDELL   Administration


 


Propranolol HCl  120 mg  10/16/20 22:00  10/18/20 22:16





  Inderal La -  PO   120 mg





  HS CORDELL   Administration





Impression


1. CKD


2. kidney donation


3. Acute LLE DVT


4. Hyperlipidemia


5. Lung Nodule


6. Obesity





Plan


- renal function is improving


- cont to monitor crt


- replace phos


- avoid nephrotoxins

## 2020-10-19 NOTE — PN
Physical Exam: 


SUBJECTIVE: Patient seen and examined this morning, breathing well, off oxygen. 

Speaking in full sentences.   








OBJECTIVE:





                                   Vital Signs











 Period  Temp  Pulse  Resp  BP Sys/Oconnell  Pulse Ox


 


 Last 24 Hr  97.7 F-98.7 F  73-88  18-20  114-134/53-82  93-98











GENERAL: The patient is awake, alert, and fully oriented, in no acute distress.


HEAD: Normal with no signs of trauma.


EYES: PERRL, extraocular movements intact, sclera anicteric, conjunctiva clear. 

No ptosis. 


ENT: Ears normal, nares patent, oropharynx clear without exudates, moist mucous 

membranes.


NECK: Trachea midline, full range of motion, supple. 


LUNGS: Breath sounds equal, clear to auscultation bilaterally, no wheezes, no 

crackles, no accessory muscle use. 


HEART: Regular rate and rhythm, S1, S2 without murmur, rub or gallop.


ABDOMEN: Soft, nontender, nondistended, normoactive bowel sounds, no guarding


EXTREMITIES: 2+ pulses, warm, well-perfused, no edema. 


NEUROLOGICAL: Normal speech, gait not observed.


PSYCH: Normal mood, normal affect.


SKIN: Warm, dry, normal turgor, no rashes or lesions noted














                         Laboratory Results - last 24 hr





                                    CBC, BMP





                                 10/19/20 07:08 





                                 10/19/20 07:08 








Active Medications











Generic Name Dose Route Start Last Admin





  Trade Name Freq  PRN Reason Stop Dose Admin


 


Acetaminophen  650 mg  10/18/20 18:27  10/19/20 03:22





  Tylenol -  PO   650 mg





  Q6H PRN   Administration





  PAIN LEVEL 1-5  


 


Apixaban  5 mg  10/19/20 10:00  10/19/20 10:51





  Eliquis -  PO   5 mg





  BID CORDELL   Administration


 


Atorvastatin Calcium  10 mg  10/16/20 22:00  10/18/20 22:17





  Lipitor -  PO   10 mg





  HS CORDELL   Administration


 


Azithromycin  500 mg  10/18/20 10:00  10/19/20 10:51





  Zithromax -  PO   500 mg





  DAILY CORDELL   Administration


 


Ceftriaxone Sodium 1 gm/  50 mls @ 100 mls/hr  10/18/20 10:00  10/19/20 10:51





  Dextrose  IVPB   100 mls/hr





  DAILY CORDELL   Administration


 


Nortriptyline HCl  50 mg  10/17/20 22:00  10/18/20 22:16





  Pamelor -  PO   50 mg





  HS CORDELL   Administration


 


Propranolol HCl  120 mg  10/16/20 22:00  10/18/20 22:16





  Inderal La -  PO   120 mg





  HS CORDELL   Administration











ASSESSMENT/PLAN:


Pt is a 66 yo M with PMHx of HLD, migraines, obesity, CKD (single kidney s/p 

donation) recent sciatica presents with complaint of shortness of breath that 

started while lifting his grandson, admitted for dyspnea and DVT.





EXERTIONAL DYSPNEA LIKELY 2/2 PULMONARY INFECTION W/ MUSCULAR CHEST PAIN ON DEEP

INSPIRATION 


-Afebrile w/ decreasing WBC (11.8)


-COVID negative


-V/Q scan -- low probability PE


-c/w Rocephin - Day - 2


-Switch to oral abx tomorrow 





NEW HFwREF WITH DIASTOLIC DYSFUNCTION


-ECHO:  EF 45-50%; Mild concentric LV hypertrophy; LV systolic function mildly 

reduced, mild global hypokinesis of LV, Grade I diastolic dysfunction


-Dyspnea on Exertion


-Per Cards:  Start Low Dose PO Lasix 20mg





LEFT DVT PROVOKED 


-Doppler U/S showing L DVT


-DC Lovenox


-Start Eliquis 5mg BID





LUNG NODULE ON IMAGING 


-f/u CT for comparison w/ PCP





HYPOPHOSPHATEMIA


-2.4 today


-Phos Nak given


-Replete <2.5





Hx OF HLD


-c/w lipitor 10mg





OBSESITY


-continue diet, weight loss, exercise, and healthy eating habits


-FU w/ PCP and outpatient dietician 





MIGRAINES


-Holding home sumatriptan


-c/w Propranolol 120mg HS


-c/w Nortriptyline 50mg HS





KIDNEY DONOR (1 KIDNEY REMAINING)


-Monitor Cr; 1.2 today


-FU outpatient nephrology 





FEN


-No standing fluids


-Monitor lytes


-Chol/fat/sodium restricted diet





PPx


-DVT: Eliquis 5mg BID





Dispo


Continue to monitor on tele





Visit type





- Emergency Visit


Emergency Visit: No





- New Patient


This patient is new to me today: Yes


Date on this admission: 10/19/20





- Critical Care


Critical Care patient: No





- Discharge Referral


Referred to [facility] No





ATTENDING PHYSICIAN STATEMENT





I saw and evaluated the patient.


I reviewed the resident's note and discussed the case with the resident.


I agree with the resident's findings and plan as documented.








SUBJECTIVE:








OBJECTIVE:








ASSESSMENT AND PLAN:

## 2020-10-19 NOTE — PN
Progress Note (short form)





- Note


Progress Note: 





PULMONARY





States feeling better today. Less short of breath and chest pain.





                                   Vital Signs











 Period  Temp  Pulse  Resp  BP Sys/Oconnell  Pulse Ox


 


 Last 24 Hr  97.7 F-98.7 F  68-88  18-20  114-134/53-82  93-98








Gen:  NAD at rest


Heart: RRR


Lung: decreased breath sounds at the bases


Abd: soft, nontender


Ext: no edema





                                    CBC, BMP





                                 10/19/20 07:08 





                                 10/19/20 07:08 





Active Medications





Acetaminophen (Tylenol -)  650 mg PO Q6H PRN


   PRN Reason: PAIN LEVEL 1-5


   Last Admin: 10/19/20 03:22 Dose:  650 mg


   Documented by: 


Apixaban (Eliquis -)  5 mg PO BID [facility]


   Last Admin: 10/19/20 10:51 Dose:  5 mg


   Documented by: 


Atorvastatin Calcium (Lipitor -)  10 mg PO [facility]


   Last Admin: 10/18/20 22:17 Dose:  10 mg


   Documented by: 


Azithromycin (Zithromax -)  500 mg PO DAILY [facility]


   Last Admin: 10/19/20 10:51 Dose:  500 mg


   Documented by: 


Ceftriaxone Sodium 1 gm/ (Dextrose)  50 mls @ 100 mls/hr IVPB DAILY [facility]


   Last Admin: 10/19/20 10:51 Dose:  100 mls/hr


   Documented by: 


Nortriptyline HCl (Pamelor -)  50 mg PO [facility]


   Last Admin: 10/18/20 22:16 Dose:  50 mg


   Documented by: 


Propranolol HCl (Inderal La -)  120 mg PO [facility]


   Last Admin: 10/18/20 22:16 Dose:  120 mg


   Documented by: 





A/P


Acute LLE DVT


Likely PE


CKD


Hyperlipidemia


Lung Nodule


Morbid Obesity





-  continue anticoagulation, agree with oral meds


-  O2 to keep SpO2 >90%


-  outpt f/u of lung nodule

## 2020-10-19 NOTE — PN
Progress Note, Physician


History of Present Illness: 





65M w/hx HLD, migraines p/w two days of worsening dyspnea, orthopnea. He reports

that two days ago he realized that he was increasingly short of breath with 

exertion while walking around the house. He reports dutifully remaining in 

quarantine and maintaining safe social distancing, except for occasionally 

watching his 3yo granddaughter. He reports that he has needed to prop himself up

to sleep at night, and that his sob worsens while laying flat. He reports 

presenting to Urgent Care today for his worsening shortness of breath and 

dyspnea, and was found to have O2% to 96% on room air that dropped to 90% with 

ambulation. He denies any lower extremity swelling, fevers, chills, cough, we

akness, confusion, chest pain, or palpitations. 








- Current Medication List


Current Medications: 


Active Medications





Acetaminophen (Tylenol -)  650 mg PO Q6H PRN


   PRN Reason: PAIN LEVEL 1-5


   Last Admin: 10/19/20 03:22 Dose:  650 mg


   Documented by: 


Apixaban (Eliquis -)  5 mg PO BID [facility]


   Last Admin: 10/19/20 10:51 Dose:  5 mg


   Documented by: 


Atorvastatin Calcium (Lipitor -)  10 mg PO [facility]


   Last Admin: 10/18/20 22:17 Dose:  10 mg


   Documented by: 


Azithromycin (Zithromax -)  500 mg PO DAILY [facility]


   Last Admin: 10/19/20 10:51 Dose:  500 mg


   Documented by: 


Ceftriaxone Sodium 1 gm/ (Dextrose)  50 mls @ 100 mls/hr IVPB DAILY [facility]


   Last Admin: 10/19/20 10:51 Dose:  100 mls/hr


   Documented by: 


Nortriptyline HCl (Pamelor -)  50 mg PO [facility]


   Last Admin: 10/18/20 22:16 Dose:  50 mg


   Documented by: 


Propranolol HCl (Inderal La -)  120 mg PO [facility]


   Last Admin: 10/18/20 22:16 Dose:  120 mg


   Documented by: 











- Objective


Vital Signs: 


                                   Vital Signs











Temperature  97.9 F   10/19/20 13:48


 


Pulse Rate  78   10/19/20 13:48


 


Respiratory Rate  18   10/19/20 13:48


 


Blood Pressure  128/67   10/19/20 13:48


 


O2 Sat by Pulse Oximetry (%)  97   10/19/20 10:00











Eyes: Yes: WNL, Conjunctiva Clear, EOM Intact


HENT: Yes: WNL, Atraumatic, Normocephalic


Neck: Yes: WNL, Supple, Trachea Midline


Cardiovascular: Yes: WNL, Regular Rate and Rhythm


Respiratory: Yes: WNL, Regular, CTA Bilaterally


Gastrointestinal: Yes: WNL, Normal Bowel Sounds


Genitourinary: Yes: WNL


Musculoskeletal: Yes: WNL


Extremities: Yes: WNL


Edema: No


Integumentary: Yes: WNL


Neurological: Yes: WNL, Alert, Oriented


...Motor Strength: WNL


Psychiatric: Yes: WNL


Labs: 


                                    CBC, BMP





                                 10/19/20 07:08 





                                 10/19/20 07:08 





                                    INR, PTT











INR  1.09  (0.83-1.09)   10/16/20  14:15    














Problem List





- Problems


(1) Acquired solitary kidney


Code(s): Z90.5 - ACQUIRED ABSENCE OF KIDNEY   





(2) DVT (deep venous thrombosis)


Code(s): I82.409 - ACUTE EMBOLISM AND THOMBOS UNSP DEEP VN UNSP LOWER EXTREMITY 

 





(3) SOB (shortness of breath) on exertion


Code(s): R06.02 - SHORTNESS OF BREATH   





(4) Suspected pulmonary embolism


Code(s): R09.89 - OTH SYMPTOMS AND SIGNS INVOLVING THE CIRC AND RESP SYSTEMS   





(5) Chest pain


Code(s): R07.9 - CHEST PAIN, UNSPECIFIED   





Assessment/Plan





Imp;


Acute DVT


LBBB - resolved


CHF decompensated


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


CHF


Neg MIBI ST Sept. 2020


V/Q scan negative for PE


ECHO nl ef in Sept 2020 - BNP nl at that time, Cr. unchanged at 1.5


ECHO U58- EF 45-50%








Plan;


Cont Eliquis


Telemetry


serial EKGs and TNIs


Low dose of PO Lasix - monitor BUN/Cr

## 2020-10-19 NOTE — PN
Teaching Attending Note


Name of Resident: Jayme Negron





ATTENDING PHYSICIAN STATEMENT





I saw and evaluated the patient.


I reviewed the resident's note and discussed the case with the resident.


I agree with the resident's findings and plan as documented.








SUBJECTIVE: pt seen and examined, feeling much better








OBJECTIVE:


                                Last Vital Signs











Temp Pulse Resp BP Pulse Ox


 


 98.7 F   80   18   122/53 L  97 


 


 10/19/20 10:00  10/19/20 10:00  10/19/20 10:00  10/19/20 10:00  10/19/20 10:00








GENERAL: The patient is awake, alert, and fully oriented, in no acute distress.


LUNGS: Breath sounds equal, lt lower lobe inspiratory rales noted, no wheezes, 

no crackles, no accessory muscle use. 


HEART: Regular rate and rhythm, S1, S2 without murmur, rub or gallop.


ABDOMEN: Soft, nontender, nondistended, normoactive bowel sounds, no guarding, 

no rebound, no hepatosplenomegaly, no masses.


EXTREMITIES: 2+ pulses, warm, well-perfused, no edema. 





Labs reviewed





Active Medications





Acetaminophen (Tylenol -)  650 mg PO Q6H PRN


   PRN Reason: PAIN LEVEL 1-5


   Last Admin: 10/19/20 03:22 Dose:  650 mg


   Documented by: 


Apixaban (Eliquis -)  5 mg PO BID [facility]


   Last Admin: 10/19/20 10:51 Dose:  5 mg


   Documented by: 


Atorvastatin Calcium (Lipitor -)  10 mg PO [facility]


   Last Admin: 10/18/20 22:17 Dose:  10 mg


   Documented by: 


Azithromycin (Zithromax -)  500 mg PO DAILY [facility]


   Last Admin: 10/19/20 10:51 Dose:  500 mg


   Documented by: 


Ceftriaxone Sodium 1 gm/ (Dextrose)  50 mls @ 100 mls/hr IVPB DAILY [facility]


   Last Admin: 10/19/20 10:51 Dose:  100 mls/hr


   Documented by: 


Nortriptyline HCl (Pamelor -)  50 mg PO [facility]


   Last Admin: 10/18/20 22:16 Dose:  50 mg


   Documented by: 


Propranolol HCl (Inderal La -)  120 mg PO [facility]


   Last Admin: 10/18/20 22:16 Dose:  120 mg


   Documented by: 








ASSESSMENT AND PLAN:


65 year old gentleman with history of hyperlipidemia, migraines, obesity, CKD( 

single kidney s/p donation) and recent sciatica presents with complaint of 

shortness of breath that started while lifting his grandson, he also endorses 

recent sciatica in sept. 2020. 





# Dyspnea with lt sided chest pain on deep inspiration/sneezing:


-improved


-Acute DVT to rule out PE, to rule out ACS


-pleurisy with possible underlying pneumonia


-afebrile, WBC trended down


-COVID test negative


-V/Q scan showed low probability for PE


-EKG no changes, trop negative


-reports last stress test negative in september


-pulmonary consult appreciated


-cardiology consult appreciated





# Lt DVT, provoked


-US doppler found new lt DVT.


-started Apixaban 5mg bid for 3-6month


-hemodynamically stable








#HFrEF with diastolic dysfunction


-low salt diet, maintain I/O


-lasix 


-ECHO showed LVEF 45-50% with grade I diastolic dysfunction


-to consider low dose carvedilol prior to dc. will need cardiology follow up





lung nodule (reported to be stable, will need f/u CT, lifetime non smoker)


HLD


Obesity


Migraine


CKD


sciatica


constipation





DVT prophylaxis on full dose AC





Plan: to switch Abx to PO in AM if improvement continues, start carvidelol and 

DC for outpatient follow up with cardiology

## 2020-10-20 VITALS — DIASTOLIC BLOOD PRESSURE: 52 MMHG | HEART RATE: 82 BPM | SYSTOLIC BLOOD PRESSURE: 114 MMHG | TEMPERATURE: 98.8 F

## 2020-10-20 LAB
ANION GAP SERPL CALC-SCNC: 7 MMOL/L (ref 8–16)
BASOPHILS # BLD: 0.5 % (ref 0–2)
BUN SERPL-MCNC: 17.6 MG/DL (ref 7–18)
CALCIUM SERPL-MCNC: 8.8 MG/DL (ref 8.5–10.1)
CHLORIDE SERPL-SCNC: 104 MMOL/L (ref 98–107)
CO2 SERPL-SCNC: 26 MMOL/L (ref 21–32)
CREAT SERPL-MCNC: 1.2 MG/DL (ref 0.55–1.3)
DEPRECATED RDW RBC AUTO: 14.1 % (ref 11.9–15.9)
EOSINOPHIL # BLD: 5.5 % (ref 0–4.5)
GLUCOSE SERPL-MCNC: 93 MG/DL (ref 74–106)
HCT VFR BLD CALC: 39.3 % (ref 35.4–49)
HGB BLD-MCNC: 13.2 GM/DL (ref 11.7–16.9)
LYMPHOCYTES # BLD: 17 % (ref 8–40)
MAGNESIUM SERPL-MCNC: 2.2 MG/DL (ref 1.8–2.4)
MCH RBC QN AUTO: 30.1 PG (ref 25.7–33.7)
MCHC RBC AUTO-ENTMCNC: 33.6 G/DL (ref 32–35.9)
MCV RBC: 89.5 FL (ref 80–96)
MONOCYTES # BLD AUTO: 8.7 % (ref 3.8–10.2)
NEUTROPHILS # BLD: 68.3 % (ref 42.8–82.8)
PHOSPHATE SERPL-MCNC: 2.8 MG/DL (ref 2.5–4.9)
PLATELET # BLD AUTO: 114 K/MM3 (ref 134–434)
PMV BLD: 7.9 FL (ref 7.5–11.1)
POTASSIUM SERPLBLD-SCNC: 4.3 MMOL/L (ref 3.5–5.1)
RBC # BLD AUTO: 4.39 M/MM3 (ref 4–5.6)
SODIUM SERPL-SCNC: 137 MMOL/L (ref 136–145)
WBC # BLD AUTO: 10 K/MM3 (ref 4–10)

## 2020-10-20 RX ADMIN — CEFTRIAXONE SCH MLS/HR: 1 INJECTION, POWDER, FOR SOLUTION INTRAMUSCULAR; INTRAVENOUS at 09:39

## 2020-10-20 RX ADMIN — FUROSEMIDE SCH MG: 20 TABLET ORAL at 09:38

## 2020-10-20 RX ADMIN — AZITHROMYCIN SCH MG: 250 TABLET, FILM COATED ORAL at 09:39

## 2020-10-20 RX ADMIN — APIXABAN SCH MG: 5 TABLET, FILM COATED ORAL at 09:38

## 2020-10-20 NOTE — PN
Teaching Attending Note


Name of Resident: Juan Roe





ATTENDING PHYSICIAN STATEMENT





I saw and evaluated the patient.


I reviewed the resident's note and discussed the case with the resident.


I agree with the resident's findings and plan as documented.








SUBJECTIVE: Feeling much improved, still slightly short of breath on exertion. 

no fever/chills/sputum. No CP/hemoptysis.








OBJECTIVE: Afebrile, Hemodynamically Stable.


                                Last Vital Signs











Temp Pulse Resp BP Pulse Ox


 


 98.8 F   82   20   114/52 L  96 


 


 10/20/20 14:00  10/20/20 14:00  10/20/20 14:00  10/20/20 14:00  10/20/20 14:00











HEENT - Atramatic, Normocephalic.


Heart - S1, S2, RRR


Lungs - clear to auscultation


Abdomen - Soft, non-tender. Bowel Sounds normal.


Extremities - no edema, no calf tenderness.


Neuro - AAO x 3. Tone/Power normal.





                         Laboratory Results - last 24 hr











  10/20/20 10/20/20 10/20/20





  06:23 06:23 08:05


 


WBC  10.0  


 


RBC  4.39  


 


Hgb  13.2  


 


Hct  39.3  


 


MCV  89.5  


 


MCH  30.1  


 


MCHC  33.6  


 


RDW  14.1  


 


Plt Count  114 L  


 


MPV  7.9  


 


Absolute Neuts (auto)  6.9  


 


Neutrophils %  68.3  


 


Lymphocytes %  17.0  


 


Monocytes %  8.7  


 


Eosinophils %  5.5 H  


 


Basophils %  0.5  


 


Nucleated RBC %  0  


 


PTT (Actin FS)    34.9


 


Sodium   137 


 


Potassium   4.3 


 


Chloride   104 


 


Carbon Dioxide   26 


 


Anion Gap   7 L 


 


BUN   17.6 


 


Creatinine   1.2 


 


Est GFR (CKD-EPI)AfAm   74.14 


 


Est GFR (CKD-EPI)NonAf   63.97 


 


Random Glucose   93 


 


Calcium   8.8 


 


Phosphorus   2.8 


 


Magnesium   2.2 


 


TSH   1.73  D 








                               Current Medications











Generic Name Dose Route Start Last Admin





  Trade Name Freq  PRN Reason Stop Dose Admin


 


Acetaminophen  650 mg  10/18/20 18:27  10/19/20 21:10





  Tylenol -  PO   650 mg





  Q6H PRN   Administration





  PAIN LEVEL 1-5  


 


Apixaban  5 mg  10/19/20 10:00  10/20/20 09:38





  Eliquis -  PO   5 mg





  BID CORDELL   Administration


 


Atorvastatin Calcium  10 mg  10/16/20 22:00  10/19/20 21:10





  Lipitor -  PO   10 mg





  HS CORDELL   Administration


 


Azithromycin  500 mg  10/18/20 10:00  10/20/20 09:39





  Zithromax -  PO   500 mg





  DAILY CORDELL   Administration


 


Ceftriaxone Sodium 1 gm/  50 mls @ 100 mls/hr  10/18/20 10:00  10/20/20 09:39





  Dextrose  IVPB   100 mls/hr





  DAILY CORDELL   Administration


 


Lisinopril  2.5 mg  10/20/20 10:00  10/20/20 09:38





  Prinivil  PO   2.5 mg





  DAILY CORDELL   Administration


 


Metoprolol Succinate  50 mg  10/20/20 13:00  10/20/20 13:35





  Toprol Xl -  PO   50 mg





  DAILY CORDELL   Administration


 


Nortriptyline HCl  50 mg  10/17/20 22:00  10/19/20 21:10





  Pamelor -  PO   50 mg





  HS CORDELL   Administration


 


Polyethylene Glycol  17 gm  10/19/20 18:38  10/19/20 19:01





  Miralax (For Daily Use) -  PO   17 gm





  BID PRN   Administration





  CONSTIPATION  








                                Home Medications











 Medication  Instructions  Recorded


 


Lovastatin 40 mg PO DAILY 09/03/20


 


Nortriptyline HCl [Pamelor -] 50 mg PO HS 09/03/20


 


propRANOLol HCL [Propranolol HCl 120 mg PO HS 09/03/20





ER]  


 


Aspirin [Aspirin EC] 81 mg PO DAILY 10/17/20


 


Fish Oil 500 mg Softgel 1 tab PO DAILY 10/17/20


 


Multivit ([facility] Formulary) 1 tab PO DAILY 10/17/20

















ASSESSMENT AND PLAN:





65 year old male with history of HLD, Migraines, Obesity, CKD 3 (single kidney 

s/p donation), presents with shortness of breath, found to have DVT/likely PE 

and possible Pneumonia.





1. Acute DVT, Likely PE


US Duplex LLE DVT


CT Chest  - Pneumonia vs acute infarct


Started on Eliquis


Hematology referral as out-patient for hypercoagulable work-up.





2. Pneumonia, L basal infiltrate, community acquired 


Afebrile, Hemodynamically stable. Leukocytosis resolved.


Day 3 Ceftriaxone/Azithro - for 2 additional days Abx therapy.





3. Acute on Chronic Systolic/Diastolic CHF - new diagnosis, improved with Lasix 

diuresis, now held as per Cardio/Nephrology.


ECHO - LVEF 45-50% with grade I diastolic dysfunction


Cardiology following - switched from Propranolol to Metoprolol. Lisinopril 

added.





4. AMRGOTH on CKD 3 - resolved.





5. Lung Nodule, stable - Pulm follow up on discharge.





6. HLD - Continue Statin





DVT px - started on Eliquis.

## 2020-10-20 NOTE — DS
Physical Exam: 


SUBJECTIVE: Patient seen and examined this morning, speaking in full sentences, 

states pain when breathing has decreased, feels less fatigued. 








OBJECTIVE:





                                   Vital Signs











 Period  Temp  Pulse  Resp  BP Sys/Oconnell  Pulse Ox


 


 Last 24 Hr  97.9 F-98.9 F  47-86  18-20  110-153/52-75  95-99








PHYSICAL EXAM





GENERAL: The patient is awake, alert, and fully oriented, in no acute distress.


HEAD: Normal with no signs of trauma.


EYES: PERRL, extraocular movements intact, sclera anicteric, conjunctiva clear. 

No ptosis. 


ENT: Ears normal, nares patent, oropharynx clear without exudates, moist mucous 

membranes.


NECK: Trachea midline, full range of motion, supple. 


LUNGS: Breath sounds equal, clear to auscultation bilaterally, no wheezes, no 

crackles, no accessory muscle use. 


HEART: Regular rate and rhythm, S1, S2 without murmur, rub or gallop.


ABDOMEN: Soft, nontender, nondistended, normoactive bowel sounds, no guarding


EXTREMITIES: 2+ pulses, warm, well-perfused, no edema. 


NEUROLOGICAL: Normal speech, gait not observed.


PSYCH: Normal mood, normal affect.


SKIN: Warm, dry, normal turgor, no rashes or lesions noted





LABS


                         Laboratory Results - last 24 hr











  10/20/20 10/20/20 10/20/20





  06:23 06:23 08:05


 


WBC  10.0  


 


RBC  4.39  


 


Hgb  13.2  


 


Hct  39.3  


 


MCV  89.5  


 


MCH  30.1  


 


MCHC  33.6  


 


RDW  14.1  


 


Plt Count  114 L  


 


MPV  7.9  


 


Absolute Neuts (auto)  6.9  


 


Neutrophils %  68.3  


 


Lymphocytes %  17.0  


 


Monocytes %  8.7  


 


Eosinophils %  5.5 H  


 


Basophils %  0.5  


 


Nucleated RBC %  0  


 


PTT (Actin FS)    34.9


 


Sodium   137 


 


Potassium   4.3 


 


Chloride   104 


 


Carbon Dioxide   26 


 


Anion Gap   7 L 


 


BUN   17.6 


 


Creatinine   1.2 


 


Est GFR (CKD-EPI)AfAm   74.14 


 


Est GFR (CKD-EPI)NonAf   63.97 


 


Random Glucose   93 


 


Calcium   8.8 


 


Phosphorus   2.8 


 


Magnesium   2.2 


 


TSH   1.73  D 











HOSPITAL COURSE:





Date of Admission:10/16/20





-ECHO:  EF 45-50%; Mild concentric LV hypertrophy; LV systolic function mildly 

reduced, mild global hypokinesis of LV, Grade I diastolic dysfunction


-Doppler U/S showing L DVT


-V/Q scan -- low probability PE


-A 1.3 cm subpleural right lower lobe nodule is noted (transaxial images 81 - 

89) containing several punctate internal lucencies. A completely calcified 

benign 1.4 cm right lower lobe granuloma is seen. There is also punctate 

calcified right upper lobe pulmonary granuloma. A 0.2 cm noncalcified nodule is 

seen within the left lower lobe laterally probably representing a granuloma.  A 

small infiltrate is seen within the left lower lobe posteriorly adjacent to the 

costophrenic sulcus





Date of Discharge: 10/20/20





Patient is a 65 year old male with history of hyperlipidemia, migraines, 

obesity, CKD presents with complaint of shortness of breath. He endorses 

symptoms ongoing for the past two days without clear inciting features. He 

noticed yesterday that he became suddenly short of breath after picking up his 

40 pound grandson -usually he is able to ride bicycle and walk without any 

shortness of breath. Last evening patient endorses having to sleep sitting 

upright due to shotness of breath. Patient initially went to Urgent Care earlier

today, however was noted that he desaturated to low 90s with light physical 

activity, prompting ED presentation. He does endorse diffuse myalgias. Denies 

sick contacts. 





Of note, patient had cardiac workup in September of this year with stress test 

which revealed EF low normal to 50%. 





Patient was admitted to the floors where he was worked up for r/o PE after 

finding a LLE DVT.  V/Q scan showed low likely rojas.  Ptn started on therapeutic

lovenox.  Due to rising white count and the infiltrate found on CT, ptn was 

started on Ceftriaxone with increased resolution of his SoB.  





Upon DC, Patient was called to clarify his Eliquis dosing to 10mg BID x7days, f

ollowed by 5mg BID afterwards w/ followup with [facility].  Additionally started on

Vantin 200mg BID x2 days and Azithro 250mg daily x2 days for completion of his 

antibiotic course.





Ptn informed of all incidental findings during his stay and was given 

instructions to FU w/ outpatient consultants.





At time of DC ptn was medically stable to go home. 








Minutes to complete discharge: 36





Discharge Summary


Problems reviewed: Yes


Reason For Visit: SHORTNESS OF BREATH  CHF


Current Active Problems





Acquired solitary kidney (Chronic)


DVT (deep venous thrombosis) (Chronic)








Condition: Stable





- Instructions


Diet, Activity, Other Instructions: 


YOUR VISIT


You came to the hospital because you were experiencing shortness of breath and 

chest pain, and left leg pain. You were admitted to the hospital for a condition

called "Deep Vein Thrombosis" which we have started you on medication for.  

Additionally, you were found to have a lung infection which we treated with 

antibiotics. Lastly, you were found to have decreased heart function, which we 

have started you on medication for. You are now stable and may return home.





Of note, during your stay, we also found the following which you can discuss 

with your doctors:


1.)CAT Scan:  A 1.3 cm subpleural right lower lobe nodule containing several 

punctate internal lucencies. A completely calcified benign 1.4 cm right lower 

lobe granuloma is seen. There is also punctate calcified right upper lobe 

pulmonary granuloma. A 0.2 cm noncalcified nodule is seen within the left lower 

lobe laterally probably representing a granuloma, which you can discuss with Dr. Lawson


2.)CAT Scan:  Evidence of gallstones, which you can discuss with Dr. Jones


3.)ECHO:  EF 45-50%; Mild concentric LV hypertrophy; LV systolic function mildly

reduced, mild global hypokinesis of LV, Grade I diastolic dysfunction, which you

can discuss with Dr. Fields


4.)EKst degree AV block, which you can discuss with Dr. Fields


5.)Ultrasound:  Left lower extremity deep vein thrombosis, which you can discuss

with Dr. Mustafa 





MEDICATIONS


Please STOP taking Propranolol 120mg, we are changing you to another medication 

for your blood pressure


Please START taking Metoprolol Succinate 50mg daily for your blood pressure and 

heart function


Please START taking Lisinopril 2.5mg daily for your blood pressure and heart 

function


Please START taking Eliquis 10mg twice a day for 7 days, followed by 5mg twice a

day (morning and night). We will send you a prescription for 1 month, however 

you will require a total course between 3-6 months. Please follow up with a 

hematologist (referral provided) to determine the total duration of the course.


Please START taking Vantin 200mg twice a day (morning and night) for 2 more days

(total 4 pills) for your lung infection.


Please START taking Azithromycin 250mg once a day, for 2 days (total 2 pills) 

for your lung infection.


Please continue to take your home medications as prescribed.  





ADDITIONAL CARE


Please make an appointment to see Dr. Titi Fields (Cardiology) within 1

week to discuss your heart function


Please make an appointment to see Dr. Ahmet Lawson (Pulmonology) within 1 

week to discuss your recent lung infection 


Please make an appointment to see Dr. Josie Velarde (Nephrology) within 1 week 

to discuss your kidney function and blood pressure


Please make an appointment to see Dr. Silvino Jones (Primary Care) within 2 weeks 

to discuss your recent hospital stay


Please make an appointment to see Dr. Korina Mustafa (Hematology/Oncology) 

within 1 week to discuss your Eliquis which is a blood thinner





ADDITIONAL INFORMATION


Please call 911 or come directly to the emergency department if you experience 

recurrence of the symptoms that brought you to the hospital, unusual headache, 

vision change, shortness of breath, chest pain, numbness, tingling, loss of 

alertness/awareness, loss of function, unusual bleeding or any alarming 

symptoms.


 

















Referrals: 


Ahmet Lawson MD [Staff Physician] - 1 Week


Titi Fields MD [Staff Physician] - 1 Week (Acute on chronic HFpEF)


Korina Mustafa MD [Staff Physician] - 1 Week (unprovoked DVT, 

hypercoagulable workup)


Josie Velarde MD [Staff Physician] - 1 Week


Silvino Jones MD [Primary Care Provider] - 2 Weeks


Disposition: HOME





- Home Medications


Comprehensive Discharge Medication List: 


Ambulatory Orders





Lovastatin 40 mg PO DAILY 20 


Nortriptyline HCl [Pamelor -] 50 mg PO HS 20 


Aspirin [Aspirin EC] 81 mg PO DAILY 10/17/20 


Fish Oil 500 mg Softgel 1 tab PO DAILY 10/17/20 


Multivit ([facility] Formulary) 1 tab PO DAILY 10/17/20 


Apixaban [Eliquis] 5 mg PO BID 30 Days #60 tablet 10/20/20 


Azithromycin 250 mg PO DAILY 2 Days #2 tablet 10/20/20 


Cefpodoxime Proxetil [Vantin -] 200 mg PO Q12H 2 Days #4 tablet 10/20/20 


Lisinopril [Prinivil] 2.5 mg PO DAILY 30 Days #30 tablet 10/20/20 


Metoprolol Succinate [Toprol XL -] 50 mg PO DAILY 30 Days #30 tab.sr.24h 

10/20/20 








This patient is new to me today: No


Emergency Visit: No


Critical Care patient: No





- Discharge Referral


Referred to [facility] No





ATTENDING PHYSICIAN STATEMENT





I saw and evaluated the patient.


I reviewed the resident's note and discussed the case with the resident.


I agree with the resident's findings and plan as documented.








SUBJECTIVE:








OBJECTIVE:








ASSESSMENT AND PLAN:

## 2020-10-20 NOTE — PN
Progress Note, Physician


History of Present Illness: 





pulmonary





alert,comfortable at rest,+ sob with min exertion,-cp,cough. echo nl rv,-pul htn





- Current Medication List


Current Medications: 


Active Medications





Acetaminophen (Tylenol -)  650 mg PO Q6H PRN


   PRN Reason: PAIN LEVEL 1-5


   Last Admin: 10/19/20 21:10 Dose:  650 mg


   Documented by: 


Apixaban (Eliquis -)  5 mg PO BID [facility]


   Last Admin: 10/20/20 09:38 Dose:  5 mg


   Documented by: 


Atorvastatin Calcium (Lipitor -)  10 mg PO HS [facility]


   Last Admin: 10/19/20 21:10 Dose:  10 mg


   Documented by: 


Azithromycin (Zithromax -)  500 mg PO DAILY [facility]


   Last Admin: 10/20/20 09:39 Dose:  500 mg


   Documented by: 


Furosemide (Lasix -)  20 mg PO DAILY [facility]


   Last Admin: 10/20/20 09:38 Dose:  20 mg


   Documented by: 


Ceftriaxone Sodium 1 gm/ (Dextrose)  50 mls @ 100 mls/hr IVPB DAILY [facility]


   Last Admin: 10/20/20 09:39 Dose:  100 mls/hr


   Documented by: 


Lisinopril (Prinivil)  2.5 mg PO DAILY [facility]


   Last Admin: 10/20/20 09:38 Dose:  2.5 mg


   Documented by: 


Nortriptyline HCl (Pamelor -)  50 mg PO [facility]


   Last Admin: 10/19/20 21:10 Dose:  50 mg


   Documented by: 


Polyethylene Glycol (Miralax (For Daily Use) -)  17 gm PO BID PRN


   PRN Reason: CONSTIPATION


   Last Admin: 10/19/20 19:01 Dose:  17 gm


   Documented by: 


Propranolol HCl (Inderal La -)  120 mg PO [facility]


   Last Admin: 10/19/20 21:10 Dose:  120 mg


   Documented by: 











- Objective


Vital Signs: 


                                   Vital Signs











Temperature  98.7 F   10/20/20 10:00


 


Pulse Rate  79   10/20/20 10:00


 


Respiratory Rate  20   10/20/20 10:00


 


Blood Pressure  132/68   10/20/20 10:00


 


O2 Sat by Pulse Oximetry (%)  95   10/20/20 10:00











Constitutional: Yes: Well Nourished, Calm


Eyes: Yes: WNL


HENT: Yes: WNL


Neck: Yes: WNL


Cardiovascular: Yes: Regular Rate and Rhythm, S1, S2


Respiratory: Yes: CTA Bilaterally


Gastrointestinal: Yes: Normal Bowel Sounds, Soft


Extremities: Yes: WNL (swelling lle), Other


Labs: 


                                    CBC, BMP





                                 10/20/20 06:23 





                                 10/20/20 06:23 





                                    INR, PTT











INR  1.09  (0.83-1.09)   10/16/20  14:15    














Assessment/Plan





A/P


Acute LLE DVT


Likely PE


CKD


Hyperlipidemia


Lung Nodule


Morbid Obesity





-  anticoagulation


-  O2 to keep SpO2 >90%


-  outpt f/u of lung nodule


- W/U for hypercoagulable state as outpatient





DR ANDERSON

## 2020-10-20 NOTE — PN
Progress Note, Physician


History of Present Illness: 





Pt seen and examined at bedside. He is awake and alert. He denies shortness of 

breath. 





- Current Medication List


Current Medications: 


Active Medications





Acetaminophen (Tylenol -)  650 mg PO Q6H PRN


   PRN Reason: PAIN LEVEL 1-5


   Last Admin: 10/19/20 21:10 Dose:  650 mg


   Documented by: 


Apixaban (Eliquis -)  5 mg PO BID [facility]


   Last Admin: 10/20/20 09:38 Dose:  5 mg


   Documented by: 


Atorvastatin Calcium (Lipitor -)  10 mg PO HS [facility]


   Last Admin: 10/19/20 21:10 Dose:  10 mg


   Documented by: 


Azithromycin (Zithromax -)  500 mg PO DAILY [facility]


   Last Admin: 10/20/20 09:39 Dose:  500 mg


   Documented by: 


Furosemide (Lasix -)  20 mg PO DAILY [facility]


   Last Admin: 10/20/20 09:38 Dose:  20 mg


   Documented by: 


Ceftriaxone Sodium 1 gm/ (Dextrose)  50 mls @ 100 mls/hr IVPB DAILY [facility]


   Last Admin: 10/20/20 09:39 Dose:  100 mls/hr


   Documented by: 


Lisinopril (Prinivil)  2.5 mg PO DAILY [facility]


   Last Admin: 10/20/20 09:38 Dose:  2.5 mg


   Documented by: 


Nortriptyline HCl (Pamelor -)  50 mg PO [facility]


   Last Admin: 10/19/20 21:10 Dose:  50 mg


   Documented by: 


Polyethylene Glycol (Miralax (For Daily Use) -)  17 gm PO BID PRN


   PRN Reason: CONSTIPATION


   Last Admin: 10/19/20 19:01 Dose:  17 gm


   Documented by: 


Propranolol HCl (Inderal La -)  120 mg PO [facility]


   Last Admin: 10/19/20 21:10 Dose:  120 mg


   Documented by: 











- Objective


Vital Signs: 


                                   Vital Signs











Temperature  98.7 F   10/20/20 10:00


 


Pulse Rate  79   10/20/20 10:00


 


Respiratory Rate  20   10/20/20 10:00


 


Blood Pressure  132/68   10/20/20 10:00


 


O2 Sat by Pulse Oximetry (%)  95   10/20/20 10:00











Constitutional: Yes: Calm


Eyes: Yes: Conjunctiva Clear


HENT: Yes: Atraumatic


Cardiovascular: Yes: S1, S2


Respiratory: Yes: CTA Bilaterally


Gastrointestinal: Yes: Soft, Abdomen, Obese


Genitourinary: Yes: WNL


Musculoskeletal: Yes: WNL


Edema: No


Neurological: Yes: Oriented


Psychiatric: Yes: Oriented


Labs: 


                                    CBC, BMP





                                 10/20/20 06:23 





                                 10/20/20 06:23 





                                    INR, PTT











INR  1.09  (0.83-1.09)   10/16/20  14:15    














Problem List





- Problems


(1) Acquired solitary kidney


Code(s): Z90.5 - ACQUIRED ABSENCE OF KIDNEY   





(2) Suspected pulmonary embolism


Code(s): R09.89 - OTH SYMPTOMS AND SIGNS INVOLVING THE CIRC AND RESP SYSTEMS   





Assessment/Plan





                              Current Medications











Generic Name Dose Route Start Last Admin





  Trade Name Freq  PRN Reason Stop Dose Admin


 


Acetaminophen  650 mg  10/18/20 18:27  10/19/20 21:10





  Tylenol -  PO   650 mg





  Q6H PRN   Administration





  PAIN LEVEL 1-5  


 


Apixaban  5 mg  10/19/20 10:00  10/20/20 09:38





  Eliquis -  PO   5 mg





  BID CORDE[facility]   Administration


 


Atorvastatin Calcium  10 mg  10/16/20 22:00  10/19/20 21:10





  Lipitor -  PO   10 mg





  HS [facility]   Administration


 


Azithromycin  500 mg  10/18/20 10:00  10/20/20 09:39





  Zithromax -  PO   500 mg





  DAILY [facility]   Administration


 


Furosemide  20 mg  10/19/20 18:45  10/20/20 09:38





  Lasix -  PO   20 mg





  DAILY [facility]   Administration


 


Ceftriaxone Sodium 1 gm/  50 mls @ 100 mls/hr  10/18/20 10:00  10/20/20 09:39





  Dextrose  IVPB   100 mls/hr





  DAILY [facility]   Administration


 


Lisinopril  2.5 mg  10/20/20 10:00  10/20/20 09:38





  Prinivil  PO   2.5 mg





  DAILY [facility]   Administration


 


Nortriptyline HCl  50 mg  10/17/20 22:00  10/19/20 21:10





  Pamelor -  PO   50 mg





  HS [facility]   Administration


 


Polyethylene Glycol  17 gm  10/19/20 18:38  10/19/20 19:01





  Miralax (For Daily Use) -  PO   17 gm





  BID PRN   Administration





  CONSTIPATION  


 


Propranolol HCl  120 mg  10/16/20 22:00  10/19/20 21:10





  Inderal La -  PO   120 mg





  HS CORDE[facility]   Administration











                                        


                                        


1. CKD


2. kidney donation


3. Acute LLE DVT


4. Hyperlipidemia


5. Lung Nodule


6. Obesity





Plan


- cont lisinopril with close monitoring of renal function


- monitor volume status off of lasix, discussed with cardio


- ua neg for blood or protein


- will need outpt follow up


- avoid nephrotoxins

## 2020-10-20 NOTE — PN
Progress Note, Physician


Chief Complaint: 





Pt A&Ox3; feels stronger today, and not dyspneic at rest.


History of Present Illness: 





Mr. Kim is a 65 yr old white man w/hx HLD, migraines (on Propanolol and 

Nortriptylene for >20 years), LBBB, obesity,?sleep apnea, donated one of his 

kidneys,  p/w two days of worsening dyspnea, orthopnea. He reports that two days

ago he realized that he was increasingly short of breath with exertion while 

walking around the house. He reports dutifully remaining in quarantine and 

maintaining safe social distancing, except for occasionally watching his 5yo 

granddaughter. He reports that he has needed to prop himself up to sleep at 

night, and that his sob worsens while laying flat. He reports presenting to 

Urgent Care today for his worsening shortness of breath and dyspnea, and was 

found to have O2% to 96% on room air that dropped to 90% with ambulation. He 

denies any lower extremity swelling, fevers, chills, cough, weakness, confusion,

chest pain, or palpitations. 





Brother had MI in his 60s.





Pt had stress MIBI at this hospital 09/2020: no ischemia.








- Current Medication List


Current Medications: 


Active Medications





Acetaminophen (Tylenol -)  650 mg PO Q6H PRN


   PRN Reason: PAIN LEVEL 1-5


   Last Admin: 10/19/20 21:10 Dose:  650 mg


   Documented by: 


Apixaban (Eliquis -)  5 mg PO BID [facility]


   Last Admin: 10/19/20 21:10 Dose:  5 mg


   Documented by: 


Atorvastatin Calcium (Lipitor -)  10 mg PO [facility]


   Last Admin: 10/19/20 21:10 Dose:  10 mg


   Documented by: 


Azithromycin (Zithromax -)  500 mg PO DAILY [facility]


   Last Admin: 10/19/20 10:51 Dose:  500 mg


   Documented by: 


Furosemide (Lasix -)  20 mg PO DAILY [facility]


   Last Admin: 10/19/20 19:01 Dose:  20 mg


   Documented by: 


Ceftriaxone Sodium 1 gm/ (Dextrose)  50 mls @ 100 mls/hr IVPB DAILY [facility]


   Last Admin: 10/19/20 10:51 Dose:  100 mls/hr


   Documented by: 


Lisinopril (Prinivil)  2.5 mg PO DAILY [facility]


Nortriptyline HCl (Pamelor -)  50 mg PO [facility]


   Last Admin: 10/19/20 21:10 Dose:  50 mg


   Documented by: 


Polyethylene Glycol (Miralax (For Daily Use) -)  17 gm PO BID PRN


   PRN Reason: CONSTIPATION


   Last Admin: 10/19/20 19:01 Dose:  17 gm


   Documented by: 


Propranolol HCl (Inderal La -)  120 mg PO [facility]


   Last Admin: 10/19/20 21:10 Dose:  120 mg


   Documented by: 











- Objective


Vital Signs: 


                                   Vital Signs











Temperature  98.9 F   10/20/20 06:00


 


Pulse Rate  77   10/20/20 06:00


 


Respiratory Rate  20   10/20/20 06:00


 


Blood Pressure  110/57 L  10/20/20 06:00


 


O2 Sat by Pulse Oximetry (%)  97   10/20/20 06:00











Labs: 


                                    CBC, BMP





                                 10/20/20 06:23 





                                 10/20/20 06:23 





                                    INR, PTT











INR  1.09  (0.83-1.09)   10/16/20  14:15    














Assessment/Plan





Imp;


Acute DVT


LBBB - resolved


systolic CHF decompensated


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


acute systolic CHF


Neg MIBI ST Sept. 2020


V/Q scan negative for PE


ECHO T84- EF 45-50%








Plan:


Start lisinopril 2.5 mg daily; f/u renal parameters carefully (discussed with 

Dr. Velarde, nephrologist).


Consider continuing small dose of furosemide if CHF again deteriorates despite 

ACEI and beta blockers.


Idealy, propanolol could be changed to one of the beta blockers proven effective

with systolic CHF (e.g., metoprolol, carvedilol, or bisoprolol). Pt says he does

not mind the change, but it should first be discussed also with pt's PMD (Dr. Jones) and neurologist.


Continue Eliquis


Telemetry


serial EKGs and TNIs


BUN/Cr, electrolytes, daily weight, Is and Os.


Pt agrees to dietary consult. He would like to be <200 lbs; he knows he eats too

many sweets and bread (loves to bake).





Addendum: I spoke with pt's PMD and his neurologist; both agree that propanolol 

may be changed to another beta bloocker.


Will start metoprolol ER 50 mg daily.

## 2022-11-07 ENCOUNTER — OFFICE (OUTPATIENT)
Dept: URBAN - METROPOLITAN AREA CLINIC 121 | Facility: CLINIC | Age: 67
Setting detail: OPHTHALMOLOGY
End: 2022-11-07
Payer: COMMERCIAL

## 2022-11-07 DIAGNOSIS — H40.013: ICD-10-CM

## 2022-11-07 PROCEDURE — 92083 EXTENDED VISUAL FIELD XM: CPT | Performed by: OPHTHALMOLOGY

## 2022-11-07 PROCEDURE — 99212 OFFICE O/P EST SF 10 MIN: CPT | Performed by: OPHTHALMOLOGY

## 2022-11-07 PROCEDURE — 92133 CPTRZD OPH DX IMG PST SGM ON: CPT | Performed by: OPHTHALMOLOGY

## 2022-11-07 ASSESSMENT — SPHEQUIV_DERIVED
OD_SPHEQUIV: -0.125
OS_SPHEQUIV: 0.25

## 2022-11-07 ASSESSMENT — KERATOMETRY
OD_AXISANGLE_DEGREES: 090
OS_K2POWER_DIOPTERS: 40.75
OD_K2POWER_DIOPTERS: 41.25
OD_K1POWER_DIOPTERS: 40.50
OS_K1POWER_DIOPTERS: 40.00
METHOD_AUTO_MANUAL: AUTO
OS_AXISANGLE_DEGREES: 089

## 2022-11-07 ASSESSMENT — REFRACTION_CURRENTRX
OS_CYLINDER: 0.00
OD_AXIS: 180
OD_OVR_VA: 20/
OD_SPHERE: -0.75
OS_AXIS: 180
OS_SPHERE: -0.75
OD_CYLINDER: 0.00
OS_OVR_VA: 20/

## 2022-11-07 ASSESSMENT — SUPERFICIAL PUNCTATE KERATITIS (SPK)
OS_SPK: 1+
OD_SPK: 1+

## 2022-11-07 ASSESSMENT — AXIALLENGTH_DERIVED
OS_AL: 24.6913
OD_AL: 24.6502

## 2022-11-07 ASSESSMENT — REFRACTION_AUTOREFRACTION
OS_SPHERE: +0.25
OS_CYLINDER: 0.00
OD_SPHERE: -0.25
OD_AXIS: 107
OD_CYLINDER: +0.25
OS_AXIS: 180

## 2022-11-07 ASSESSMENT — VISUAL ACUITY
OS_BCVA: 20/20
OD_BCVA: 20/20

## 2022-11-07 ASSESSMENT — CONFRONTATIONAL VISUAL FIELD TEST (CVF)
OS_FINDINGS: FULL
OD_FINDINGS: FULL

## 2023-05-19 ENCOUNTER — OFFICE (OUTPATIENT)
Dept: URBAN - METROPOLITAN AREA CLINIC 121 | Facility: CLINIC | Age: 68
Setting detail: OPHTHALMOLOGY
End: 2023-05-19
Payer: COMMERCIAL

## 2023-05-19 DIAGNOSIS — H40.013: ICD-10-CM

## 2023-05-19 DIAGNOSIS — H16.223: ICD-10-CM

## 2023-05-19 DIAGNOSIS — H35.3131: ICD-10-CM

## 2023-05-19 DIAGNOSIS — H35.033: ICD-10-CM

## 2023-05-19 DIAGNOSIS — H35.54: ICD-10-CM

## 2023-05-19 DIAGNOSIS — H25.013: ICD-10-CM

## 2023-05-19 PROCEDURE — 92014 COMPRE OPH EXAM EST PT 1/>: CPT | Performed by: OPHTHALMOLOGY

## 2023-05-19 PROCEDURE — 92250 FUNDUS PHOTOGRAPHY W/I&R: CPT | Performed by: OPHTHALMOLOGY

## 2023-05-19 ASSESSMENT — TONOMETRY
OS_IOP_MMHG: 15
OD_IOP_MMHG: 15

## 2023-05-19 ASSESSMENT — AXIALLENGTH_DERIVED
OS_AL: 24.8555
OD_AL: 24.5972

## 2023-05-19 ASSESSMENT — KERATOMETRY
OD_K1POWER_DIOPTERS: 40.50
OS_K1POWER_DIOPTERS: 40.25
OD_K2POWER_DIOPTERS: 41.25
METHOD_AUTO_MANUAL: AUTO
OD_AXISANGLE_DEGREES: 094
OS_AXISANGLE_DEGREES: 087
OS_K2POWER_DIOPTERS: 40.75

## 2023-05-19 ASSESSMENT — REFRACTION_CURRENTRX
OD_OVR_VA: 20/
OS_OVR_VA: 20/
OD_SPHERE: -0.75
OS_AXIS: 180
OS_SPHERE: -0.75
OS_CYLINDER: 0.00
OD_CYLINDER: 0.00
OD_AXIS: 180

## 2023-05-19 ASSESSMENT — PACHYMETRY
OD_CT_CORRECTION: -1
OD_CT_UM: 565
OS_CT_CORRECTION: -1
OS_CT_UM: 565

## 2023-05-19 ASSESSMENT — REFRACTION_AUTOREFRACTION
OD_AXIS: 105
OD_CYLINDER: +0.50
OS_SPHERE: -0.50
OS_AXIS: 091
OS_CYLINDER: +0.50
OD_SPHERE: -0.25

## 2023-05-19 ASSESSMENT — VISUAL ACUITY
OD_BCVA: 20/25
OS_BCVA: 20/25

## 2023-05-19 ASSESSMENT — SUPERFICIAL PUNCTATE KERATITIS (SPK)
OS_SPK: 1+
OD_SPK: 1+

## 2023-05-19 ASSESSMENT — CONFRONTATIONAL VISUAL FIELD TEST (CVF)
OD_FINDINGS: FULL
OS_FINDINGS: FULL

## 2023-05-19 ASSESSMENT — SPHEQUIV_DERIVED
OD_SPHEQUIV: 0
OS_SPHEQUIV: -0.25

## 2023-11-20 ENCOUNTER — OFFICE (OUTPATIENT)
Dept: URBAN - METROPOLITAN AREA CLINIC 121 | Facility: CLINIC | Age: 68
Setting detail: OPHTHALMOLOGY
End: 2023-11-20
Payer: COMMERCIAL

## 2023-11-20 DIAGNOSIS — H35.033: ICD-10-CM

## 2023-11-20 DIAGNOSIS — H16.223: ICD-10-CM

## 2023-11-20 DIAGNOSIS — H40.013: ICD-10-CM

## 2023-11-20 DIAGNOSIS — H35.54: ICD-10-CM

## 2023-11-20 DIAGNOSIS — H25.013: ICD-10-CM

## 2023-11-20 DIAGNOSIS — H35.3131: ICD-10-CM

## 2023-11-20 PROCEDURE — 92083 EXTENDED VISUAL FIELD XM: CPT | Performed by: OPHTHALMOLOGY

## 2023-11-20 PROCEDURE — 99213 OFFICE O/P EST LOW 20 MIN: CPT | Performed by: OPHTHALMOLOGY

## 2023-11-20 PROCEDURE — 92133 CPTRZD OPH DX IMG PST SGM ON: CPT | Performed by: OPHTHALMOLOGY

## 2023-11-20 ASSESSMENT — SPHEQUIV_DERIVED
OS_SPHEQUIV: -0.25
OD_SPHEQUIV: 0

## 2023-11-20 ASSESSMENT — REFRACTION_AUTOREFRACTION
OD_CYLINDER: +0.50
OD_AXIS: 105
OS_AXIS: 091
OD_SPHERE: -0.25
OS_SPHERE: -0.50
OS_CYLINDER: +0.50

## 2023-11-20 ASSESSMENT — REFRACTION_CURRENTRX
OD_SPHERE: -0.75
OS_SPHERE: -0.75
OD_CYLINDER: 0.00
OS_CYLINDER: 0.00
OD_OVR_VA: 20/
OS_AXIS: 180
OS_OVR_VA: 20/
OD_AXIS: 180

## 2023-11-20 ASSESSMENT — CONFRONTATIONAL VISUAL FIELD TEST (CVF)
OS_FINDINGS: FULL
OD_FINDINGS: FULL

## 2023-11-20 ASSESSMENT — SUPERFICIAL PUNCTATE KERATITIS (SPK)
OS_SPK: 1+
OD_SPK: 1+

## 2024-01-04 NOTE — CON.CARD
Consult


Consult Specialty:: Cardiology





- History of Present Illness


History of Present Illness: 





65M w/hx HLD, migraines p/w two days of worsening dyspnea, orthopnea. He reports

that two days ago he realized that he was increasingly short of breath with 

exertion while walking around the house. He reports dutifully remaining in 

quarantine and maintaining safe social distancing, except for occasionally donna

miky his 3yo granddaughter. He reports that he has needed to prop himself up to

sleep at night, and that his sob worsens while laying flat. He reports 

presenting to Urgent Care today for his worsening shortness of breath and 

dyspnea, and was found to have O2% to 96% on room air that dropped to 90% with 

ambulation. He denies any lower extremity swelling, fevers, chills, cough, 

weakness, confusion, chest pain, or palpitations. 








- History Source


History Provided By: Patient, Medical Record





- Past Medical History


CNS: No: Alzheimer's


Cardio/Vascular: Yes: Hyperlipdemia.  No: AFIB


Pulmonary: No: COPD, Pulmonary Embolus


Gastrointestinal: No: Ascites


Hepatobiliary: No: Cirrhosis


Renal/: No: Renal Failure


Psych: No: Addictions


Musculoskeletal: Yes: Chronic low back pain


Rheumatology: No: Fibromyalgia


Endocrine: No: Diabetes Mellitus





- Past Surgical History


Past Surgical History: Yes: Tonsillectomy





- Alcohol/Substance Use


Hx Alcohol Use: Yes (RARELY)


History of Substance Use: reports: None





- Smoking History


Smoking history: Never smoked


Have you smoked in the past 12 months: No


Aproximately how many cigarettes per day: 0





- Social History


ADL: Independent


History of Recent Travel: No





Home Medications





- Allergies


Allergies/Adverse Reactions: 


                                    Allergies











Allergy/AdvReac Type Severity Reaction Status Date / Time


 


morphine AdvReac Mild  Verified 10/16/20 13:28














- Home Medications


Home Medications: 


Ambulatory Orders





Lovastatin 40 mg PO DAILY 09/03/20 


Nortriptyline HCl [Pamelor -] 50 mg PO HS 09/03/20 


propRANOLol HCL [Propranolol HCl ER] 120 mg PO HS 09/03/20 


Aspirin [Aspirin EC] 81 mg PO DAILY 10/17/20 


Fish Oil 500 mg Softgel 1 tab PO DAILY 10/17/20 


Multivit ([facility] Formulary) 1 tab PO DAILY 10/17/20 











Family Medical History


Family Hx Cardiac Disorders: Brother (MI at age 59)





Review of Systems





- Review of Systems


Constitutional: reports: No Symptoms


Eyes: reports: No Symptoms


HENT: reports: No Symptoms


Neck: reports: No Symptoms


Cardiovascular: reports: Shortness of Breath


Respiratory: reports: SOB, SOB on Exertion


Gastrointestinal: reports: No Symptoms


Genitourinary: reports: No Symptoms


Breasts: reports: No Symptoms Reported


Musculoskeletal: reports: No Symptoms


Integumentary: reports: No Symptoms


Neurological: reports: No Symptoms


Endocrine: reports: No Symptoms


Hematology/Lymphatic: reports: No Symptoms


Psychiatric: reports: No Symptoms


Vital Signs: 


                                   Vital Signs











Temperature  97.5 F L  10/17/20 10:00


 


Pulse Rate  82   10/17/20 10:00


 


Respiratory Rate  18   10/17/20 10:00


 


Blood Pressure  119/77   10/17/20 10:00


 


O2 Sat by Pulse Oximetry (%)  97   10/17/20 10:00











Constitutional: Yes: Well Nourished, No Distress, Calm


Eyes: Yes: WNL, Conjunctiva Clear, EOM Intact


HENT: Yes: WNL, Atraumatic, Normocephalic


Neck: Yes: WNL, Supple, Trachea Midline


Respiratory: Yes: WNL, Regular, CTA Bilaterally


Gastrointestinal: Yes: WNL, Normal Bowel Sounds


Renal/: Yes: WNL


Cardiovascular: Yes: WNL, Regular Rate and Rhythm


Musculoskeletal: Yes: WNL


Extremities: Yes: WNL


Integumentary: Yes: WNL


Neurological: Yes: WNL, Alert, Oriented


...Motor Strength: WNL


Psychiatric: Yes: WNL, Alert, Oriented





- Other Data


Labs, Other Data: 


                                    CBC, BMP





                                 10/17/20 06:55 





                                 10/17/20 06:55 





                                    INR, PTT











INR  1.09  (0.83-1.09)   10/16/20  14:15    














Imaging





- Results


Chest X-ray: Image Reviewed (no i/e)


EKG: Image Reviewed (sr lbbb)





Problem List





- Problems


(1) Acquired solitary kidney


Code(s): Z90.5 - ACQUIRED ABSENCE OF KIDNEY   





(2) DVT (deep venous thrombosis)


Code(s): I82.409 - ACUTE EMBOLISM AND THOMBOS UNSP DEEP VN UNSP LOWER EXTREMITY 

 





(3) SOB (shortness of breath) on exertion


Code(s): R06.02 - SHORTNESS OF BREATH   





(4) Suspected pulmonary embolism


Code(s): R09.89 - OTH SYMPTOMS AND SIGNS INVOLVING THE CIRC AND RESP SYSTEMS   





(5) Chest pain


Code(s): R07.9 - CHEST PAIN, UNSPECIFIED   





Assessment/Plan





Imp;


Acute DVT


LBBB


CHF decompensated


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


CHF


Neg MIBI ST Sept. 2020


V/Q scan negative for PE


ECHO nl ef in Sept 2020 - BNP nl at that time, Cr. unchanged at 1.5








Plan;


AC Lovenox than DOAC


Telemetry


serial EKGs and TNIs


Low dose of PO Lasix - monitor BUN/Cr


ECHO decreased carmelo/decreased weight-shifting ability

## 2024-05-20 ENCOUNTER — OFFICE (OUTPATIENT)
Dept: URBAN - METROPOLITAN AREA CLINIC 121 | Facility: CLINIC | Age: 69
Setting detail: OPHTHALMOLOGY
End: 2024-05-20
Payer: MEDICARE

## 2024-05-20 DIAGNOSIS — H35.3131: ICD-10-CM

## 2024-05-20 DIAGNOSIS — H35.54: ICD-10-CM

## 2024-05-20 DIAGNOSIS — H16.223: ICD-10-CM

## 2024-05-20 DIAGNOSIS — H35.033: ICD-10-CM

## 2024-05-20 DIAGNOSIS — H40.013: ICD-10-CM

## 2024-05-20 DIAGNOSIS — H25.013: ICD-10-CM

## 2024-05-20 PROCEDURE — 92250 FUNDUS PHOTOGRAPHY W/I&R: CPT | Performed by: OPHTHALMOLOGY

## 2024-05-20 PROCEDURE — 99214 OFFICE O/P EST MOD 30 MIN: CPT | Performed by: OPHTHALMOLOGY

## 2024-05-20 ASSESSMENT — CONFRONTATIONAL VISUAL FIELD TEST (CVF)
OD_FINDINGS: FULL
OS_FINDINGS: FULL

## 2024-11-18 ENCOUNTER — OFFICE (OUTPATIENT)
Facility: LOCATION | Age: 69
Setting detail: OPHTHALMOLOGY
End: 2024-11-18
Payer: MEDICARE

## 2024-11-18 DIAGNOSIS — H40.013: ICD-10-CM

## 2024-11-18 DIAGNOSIS — H35.54: ICD-10-CM

## 2024-11-18 DIAGNOSIS — H35.033: ICD-10-CM

## 2024-11-18 DIAGNOSIS — H35.3131: ICD-10-CM

## 2024-11-18 DIAGNOSIS — H16.223: ICD-10-CM

## 2024-11-18 DIAGNOSIS — H25.013: ICD-10-CM

## 2024-11-18 PROCEDURE — 92133 CPTRZD OPH DX IMG PST SGM ON: CPT | Performed by: OPHTHALMOLOGY

## 2024-11-18 PROCEDURE — 92012 INTRM OPH EXAM EST PATIENT: CPT | Performed by: OPHTHALMOLOGY

## 2024-11-18 PROCEDURE — 92083 EXTENDED VISUAL FIELD XM: CPT | Performed by: OPHTHALMOLOGY

## 2024-11-18 ASSESSMENT — REFRACTION_CURRENTRX
OD_OVR_VA: 20/
OS_AXIS: 180
OS_AXIS: 180
OD_CYLINDER: 0.00
OD_AXIS: 180
OD_CYLINDER: 0.00
OS_OVR_VA: 20/
OS_CYLINDER: 0.00
OD_SPHERE: -0.75
OD_OVR_VA: 20/
OD_AXIS: 180
OS_OVR_VA: 20/
OS_SPHERE: -0.75
OS_SPHERE: -0.75
OD_SPHERE: -0.75
OS_CYLINDER: 0.00

## 2024-11-18 ASSESSMENT — SUPERFICIAL PUNCTATE KERATITIS (SPK)
OS_SPK: 1+
OD_SPK: 1+

## 2024-11-18 ASSESSMENT — REFRACTION_AUTOREFRACTION
OD_CYLINDER: +0.25
OS_SPHERE: +0.25
OD_AXIS: 095
OD_SPHERE: -0.25
OS_AXIS: 085
OS_CYLINDER: +0.50

## 2024-11-18 ASSESSMENT — KERATOMETRY
METHOD_AUTO_MANUAL: AUTO
OD_K1POWER_DIOPTERS: 40.75
OD_K2POWER_DIOPTERS: 41.25
OS_K2POWER_DIOPTERS: 40.75
OD_AXISANGLE_DEGREES: 093
OS_AXISANGLE_DEGREES: 090
OS_K1POWER_DIOPTERS: 40.00

## 2024-11-18 ASSESSMENT — VISUAL ACUITY
OS_BCVA: 20/25
OD_BCVA: 20/25

## 2024-11-18 ASSESSMENT — CONFRONTATIONAL VISUAL FIELD TEST (CVF)
OD_FINDINGS: FULL
OS_FINDINGS: FULL

## 2025-05-19 ENCOUNTER — OFFICE (OUTPATIENT)
Facility: LOCATION | Age: 70
Setting detail: OPHTHALMOLOGY
End: 2025-05-19
Payer: MEDICARE

## 2025-05-19 DIAGNOSIS — H35.54: ICD-10-CM

## 2025-05-19 DIAGNOSIS — H40.013: ICD-10-CM

## 2025-05-19 DIAGNOSIS — H16.223: ICD-10-CM

## 2025-05-19 DIAGNOSIS — H35.3131: ICD-10-CM

## 2025-05-19 DIAGNOSIS — H25.013: ICD-10-CM

## 2025-05-19 DIAGNOSIS — H35.033: ICD-10-CM

## 2025-05-19 PROCEDURE — 92250 FUNDUS PHOTOGRAPHY W/I&R: CPT | Performed by: OPHTHALMOLOGY

## 2025-05-19 PROCEDURE — 92014 COMPRE OPH EXAM EST PT 1/>: CPT | Performed by: OPHTHALMOLOGY

## 2025-05-19 ASSESSMENT — VISUAL ACUITY
OD_BCVA: 20/25
OS_BCVA: 20/25

## 2025-05-19 ASSESSMENT — REFRACTION_CURRENTRX
OS_CYLINDER: 0.00
OD_CYLINDER: 0.00
OD_AXIS: 180
OD_SPHERE: -0.75
OS_AXIS: 180
OS_OVR_VA: 20/
OD_OVR_VA: 20/
OD_OVR_VA: 20/
OS_AXIS: 180
OS_SPHERE: -0.75
OD_CYLINDER: 0.00
OS_SPHERE: -0.75
OS_CYLINDER: 0.00
OD_SPHERE: -0.75
OS_OVR_VA: 20/
OD_AXIS: 180

## 2025-05-19 ASSESSMENT — KERATOMETRY
OD_K2POWER_DIOPTERS: 41.25
OS_K2POWER_DIOPTERS: 40.75
OS_K1POWER_DIOPTERS: 40.00
METHOD_AUTO_MANUAL: AUTO
OS_AXISANGLE_DEGREES: 089
OD_K1POWER_DIOPTERS: 40.50
OD_AXISANGLE_DEGREES: 090

## 2025-05-19 ASSESSMENT — PACHYMETRY
OS_CT_UM: 565
OD_CT_UM: 565
OS_CT_CORRECTION: -1
OD_CT_CORRECTION: -1

## 2025-05-19 ASSESSMENT — TONOMETRY
OD_IOP_MMHG: 15
OS_IOP_MMHG: 15

## 2025-05-19 ASSESSMENT — REFRACTION_AUTOREFRACTION
OD_SPHERE: -0.25
OD_AXIS: 085
OD_CYLINDER: +0.75
OS_CYLINDER: +0.75
OS_SPHERE: 0.00
OS_AXIS: 075

## 2025-05-19 ASSESSMENT — SUPERFICIAL PUNCTATE KERATITIS (SPK)
OD_SPK: 1+
OS_SPK: 1+

## 2025-05-19 ASSESSMENT — CONFRONTATIONAL VISUAL FIELD TEST (CVF)
OD_FINDINGS: FULL
OS_FINDINGS: FULL